# Patient Record
Sex: FEMALE | Race: BLACK OR AFRICAN AMERICAN | NOT HISPANIC OR LATINO | Employment: FULL TIME | URBAN - METROPOLITAN AREA
[De-identification: names, ages, dates, MRNs, and addresses within clinical notes are randomized per-mention and may not be internally consistent; named-entity substitution may affect disease eponyms.]

---

## 2023-02-24 LAB
ALANINE AMINOTRANSFERASE (SGPT) (U/L) IN SER/PLAS: 11 U/L (ref 7–45)
ALBUMIN (G/DL) IN SER/PLAS: 3.8 G/DL (ref 3.4–5)
ALKALINE PHOSPHATASE (U/L) IN SER/PLAS: 70 U/L (ref 33–110)
ANION GAP IN SER/PLAS: 12 MMOL/L (ref 10–20)
APPEARANCE, URINE: CLEAR
ASPARTATE AMINOTRANSFERASE (SGOT) (U/L) IN SER/PLAS: 14 U/L (ref 9–39)
BASOPHILS (10*3/UL) IN BLOOD BY AUTOMATED COUNT: 0.05 X10E9/L (ref 0–0.1)
BASOPHILS/100 LEUKOCYTES IN BLOOD BY AUTOMATED COUNT: 0.4 % (ref 0–2)
BILIRUBIN TOTAL (MG/DL) IN SER/PLAS: 0.5 MG/DL (ref 0–1.2)
BILIRUBIN, URINE: NEGATIVE
BLOOD, URINE: NEGATIVE
CALCIDIOL (25 OH VITAMIN D3) (NG/ML) IN SER/PLAS: 28 NG/ML
CALCIUM (MG/DL) IN SER/PLAS: 9 MG/DL (ref 8.6–10.3)
CARBON DIOXIDE, TOTAL (MMOL/L) IN SER/PLAS: 26 MMOL/L (ref 21–32)
CHLORIDE (MMOL/L) IN SER/PLAS: 107 MMOL/L (ref 98–107)
CHOLESTEROL (MG/DL) IN SER/PLAS: 196 MG/DL (ref 0–199)
CHOLESTEROL IN HDL (MG/DL) IN SER/PLAS: 50.9 MG/DL
CHOLESTEROL/HDL RATIO: 3.9
COBALAMIN (VITAMIN B12) (PG/ML) IN SER/PLAS: 738 PG/ML (ref 211–911)
COLOR, URINE: YELLOW
CREATININE (MG/DL) IN SER/PLAS: 0.84 MG/DL (ref 0.5–1.05)
EOSINOPHILS (10*3/UL) IN BLOOD BY AUTOMATED COUNT: 0.11 X10E9/L (ref 0–0.7)
EOSINOPHILS/100 LEUKOCYTES IN BLOOD BY AUTOMATED COUNT: 0.8 % (ref 0–6)
ERYTHROCYTE DISTRIBUTION WIDTH (RATIO) BY AUTOMATED COUNT: 15.8 % (ref 11.5–14.5)
ERYTHROCYTE MEAN CORPUSCULAR HEMOGLOBIN CONCENTRATION (G/DL) BY AUTOMATED: 32.9 G/DL (ref 32–36)
ERYTHROCYTE MEAN CORPUSCULAR VOLUME (FL) BY AUTOMATED COUNT: 93 FL (ref 80–100)
ERYTHROCYTES (10*6/UL) IN BLOOD BY AUTOMATED COUNT: 4.54 X10E12/L (ref 4–5.2)
ESTIMATED AVERAGE GLUCOSE FOR HBA1C: 105 MG/DL
GFR FEMALE: 82 ML/MIN/1.73M2
GLUCOSE (MG/DL) IN SER/PLAS: 75 MG/DL (ref 74–99)
GLUCOSE, URINE: NEGATIVE MG/DL
HEMATOCRIT (%) IN BLOOD BY AUTOMATED COUNT: 42.3 % (ref 36–46)
HEMOGLOBIN (G/DL) IN BLOOD: 13.9 G/DL (ref 12–16)
HEMOGLOBIN A1C/HEMOGLOBIN TOTAL IN BLOOD: 5.3 %
IMMATURE GRANULOCYTES/100 LEUKOCYTES IN BLOOD BY AUTOMATED COUNT: 0.3 % (ref 0–0.9)
KETONES, URINE: NEGATIVE MG/DL
LDL: 124 MG/DL (ref 0–99)
LEUKOCYTE ESTERASE, URINE: NEGATIVE
LEUKOCYTES (10*3/UL) IN BLOOD BY AUTOMATED COUNT: 13.5 X10E9/L (ref 4.4–11.3)
LYMPHOCYTES (10*3/UL) IN BLOOD BY AUTOMATED COUNT: 2.63 X10E9/L (ref 1.2–4.8)
LYMPHOCYTES/100 LEUKOCYTES IN BLOOD BY AUTOMATED COUNT: 19.5 % (ref 13–44)
MONOCYTES (10*3/UL) IN BLOOD BY AUTOMATED COUNT: 1.02 X10E9/L (ref 0.1–1)
MONOCYTES/100 LEUKOCYTES IN BLOOD BY AUTOMATED COUNT: 7.6 % (ref 2–10)
NEUTROPHILS (10*3/UL) IN BLOOD BY AUTOMATED COUNT: 9.65 X10E9/L (ref 1.2–7.7)
NEUTROPHILS/100 LEUKOCYTES IN BLOOD BY AUTOMATED COUNT: 71.4 % (ref 40–80)
NITRITE, URINE: NEGATIVE
PH, URINE: 5.5 (ref 5–8)
PLATELETS (10*3/UL) IN BLOOD AUTOMATED COUNT: 352 X10E9/L (ref 150–450)
POTASSIUM (MMOL/L) IN SER/PLAS: 4.2 MMOL/L (ref 3.5–5.3)
PROTEIN TOTAL: 7 G/DL (ref 6.4–8.2)
PROTEIN, URINE: NEGATIVE MG/DL
SODIUM (MMOL/L) IN SER/PLAS: 141 MMOL/L (ref 136–145)
SPECIFIC GRAVITY, URINE: 1.03 (ref 1–1.03)
THYROTROPIN (MIU/L) IN SER/PLAS BY DETECTION LIMIT <= 0.05 MIU/L: 1.9 MIU/L (ref 0.44–3.98)
TRIGLYCERIDE (MG/DL) IN SER/PLAS: 106 MG/DL (ref 0–149)
UREA NITROGEN (MG/DL) IN SER/PLAS: 19 MG/DL (ref 6–23)
UROBILINOGEN, URINE: <2 MG/DL (ref 0–1.9)
VLDL: 21 MG/DL (ref 0–40)

## 2023-04-09 PROBLEM — M54.2 NECK PAIN: Status: ACTIVE | Noted: 2023-04-09

## 2023-04-09 PROBLEM — Z98.1 STATUS POST CERVICAL SPINAL FUSION: Status: ACTIVE | Noted: 2023-04-09

## 2023-04-09 PROBLEM — M25.562 BILATERAL KNEE PAIN: Status: ACTIVE | Noted: 2023-04-09

## 2023-04-09 PROBLEM — G47.33 OBSTRUCTIVE SLEEP APNEA: Status: ACTIVE | Noted: 2023-04-09

## 2023-04-09 PROBLEM — G44.40 MEDICATION OVERUSE HEADACHE: Status: ACTIVE | Noted: 2023-04-09

## 2023-04-09 PROBLEM — S13.9XXA CERVICAL SPRAIN: Status: ACTIVE | Noted: 2023-04-09

## 2023-04-09 PROBLEM — R05.9 COUGH: Status: ACTIVE | Noted: 2023-04-09

## 2023-04-09 PROBLEM — G89.29 CHRONIC MUSCULOSKELETAL PAIN: Status: ACTIVE | Noted: 2023-04-09

## 2023-04-09 PROBLEM — H02.843 SWELLING OF EYELID, RIGHT: Status: ACTIVE | Noted: 2023-04-09

## 2023-04-09 PROBLEM — M77.8 SHOULDER TENDINITIS, LEFT: Status: ACTIVE | Noted: 2023-04-09

## 2023-04-09 PROBLEM — G56.02 CARPAL TUNNEL SYNDROME OF LEFT WRIST: Status: ACTIVE | Noted: 2023-04-09

## 2023-04-09 PROBLEM — J45.909 ASTHMATIC BRONCHITIS (HHS-HCC): Status: ACTIVE | Noted: 2023-04-09

## 2023-04-09 PROBLEM — D25.9 FIBROID UTERUS: Status: ACTIVE | Noted: 2023-04-09

## 2023-04-09 PROBLEM — S80.02XA CONTUSION OF KNEE, LEFT: Status: ACTIVE | Noted: 2023-04-09

## 2023-04-09 PROBLEM — M79.7 FIBROMYALGIA: Status: ACTIVE | Noted: 2023-04-09

## 2023-04-09 PROBLEM — S43.409A SHOULDER SPRAIN: Status: ACTIVE | Noted: 2023-04-09

## 2023-04-09 PROBLEM — F17.200 TOBACCO DEPENDENCE: Status: ACTIVE | Noted: 2023-04-09

## 2023-04-09 PROBLEM — R19.7 DIARRHEA: Status: ACTIVE | Noted: 2023-04-09

## 2023-04-09 PROBLEM — M16.0 BILATERAL HIP JOINT ARTHRITIS: Status: ACTIVE | Noted: 2023-04-09

## 2023-04-09 PROBLEM — H53.8 BLURRED VISION, BILATERAL: Status: ACTIVE | Noted: 2023-04-09

## 2023-04-09 PROBLEM — N93.9 ABNORMAL UTERINE BLEEDING (AUB): Status: ACTIVE | Noted: 2023-04-09

## 2023-04-09 PROBLEM — M79.642 PAIN OF LEFT HAND: Status: ACTIVE | Noted: 2023-04-09

## 2023-04-09 PROBLEM — J06.9 ACUTE URI: Status: ACTIVE | Noted: 2023-04-09

## 2023-04-09 PROBLEM — M62.89 HIGH-TONE PELVIC FLOOR DYSFUNCTION IN FEMALE: Status: ACTIVE | Noted: 2023-04-09

## 2023-04-09 PROBLEM — M51.36 LUMBAR DEGENERATIVE DISC DISEASE: Status: ACTIVE | Noted: 2023-04-09

## 2023-04-09 PROBLEM — R07.9 CHEST PAIN: Status: ACTIVE | Noted: 2023-04-09

## 2023-04-09 PROBLEM — M54.32 BILATERAL SCIATICA: Status: ACTIVE | Noted: 2023-04-09

## 2023-04-09 PROBLEM — S63.636A SPRAIN OF INTERPHALANGEAL JOINT OF RIGHT LITTLE FINGER: Status: ACTIVE | Noted: 2023-04-09

## 2023-04-09 PROBLEM — M51.369 LUMBAR DEGENERATIVE DISC DISEASE: Status: ACTIVE | Noted: 2023-04-09

## 2023-04-09 PROBLEM — E66.01 MORBID OBESITY (MULTI): Status: ACTIVE | Noted: 2023-04-09

## 2023-04-09 PROBLEM — D17.0 LIPOMA OF NECK: Status: ACTIVE | Noted: 2023-04-09

## 2023-04-09 PROBLEM — G43.909 MIGRAINES: Status: ACTIVE | Noted: 2023-04-09

## 2023-04-09 PROBLEM — L85.1 ACQUIRED PLANTAR POROKERATOSIS: Status: ACTIVE | Noted: 2023-04-09

## 2023-04-09 PROBLEM — L30.9 ECZEMA: Status: ACTIVE | Noted: 2023-04-09

## 2023-04-09 PROBLEM — R42 DIZZINESS: Status: ACTIVE | Noted: 2023-04-09

## 2023-04-09 PROBLEM — M79.644 THUMB PAIN, RIGHT: Status: ACTIVE | Noted: 2023-04-09

## 2023-04-09 PROBLEM — B07.0 PLANTAR WART OF RIGHT FOOT: Status: ACTIVE | Noted: 2023-04-09

## 2023-04-09 PROBLEM — R55 VASOVAGAL SYNCOPE: Status: ACTIVE | Noted: 2023-04-09

## 2023-04-09 PROBLEM — I51.7 CARDIOMEGALY: Status: ACTIVE | Noted: 2023-04-09

## 2023-04-09 PROBLEM — M25.561 BILATERAL KNEE PAIN: Status: ACTIVE | Noted: 2023-04-09

## 2023-04-09 PROBLEM — R59.9 REACTIVE LYMPHADENOPATHY: Status: ACTIVE | Noted: 2023-04-09

## 2023-04-09 PROBLEM — H40.003 GLAUCOMA SUSPECT, BOTH EYES: Status: ACTIVE | Noted: 2023-04-09

## 2023-04-09 PROBLEM — M54.12 LEFT CERVICAL RADICULOPATHY: Status: ACTIVE | Noted: 2023-04-09

## 2023-04-09 PROBLEM — R41.82 CHANGE IN MENTAL STATUS: Status: ACTIVE | Noted: 2023-04-09

## 2023-04-09 PROBLEM — S89.90XA KNEE INJURY, INITIAL ENCOUNTER: Status: ACTIVE | Noted: 2023-04-09

## 2023-04-09 PROBLEM — M54.32 SCIATICA OF LEFT SIDE: Status: ACTIVE | Noted: 2023-04-09

## 2023-04-09 PROBLEM — J06.9 UPPER RESPIRATORY INFECTION: Status: ACTIVE | Noted: 2023-04-09

## 2023-04-09 PROBLEM — B02.39: Status: ACTIVE | Noted: 2023-04-09

## 2023-04-09 PROBLEM — D17.0 LIPOMA OF SCALP: Status: ACTIVE | Noted: 2023-04-09

## 2023-04-09 PROBLEM — F19.939 MEDICATION WITHDRAWAL (MULTI): Status: ACTIVE | Noted: 2023-04-09

## 2023-04-09 PROBLEM — E04.9 GOITER: Status: ACTIVE | Noted: 2023-04-09

## 2023-04-09 PROBLEM — J40 SINOBRONCHITIS: Status: ACTIVE | Noted: 2023-04-09

## 2023-04-09 PROBLEM — M25.572 ANKLE PAIN, LEFT: Status: ACTIVE | Noted: 2023-04-09

## 2023-04-09 PROBLEM — R53.83 FATIGUE: Status: ACTIVE | Noted: 2023-04-09

## 2023-04-09 PROBLEM — S05.02XA LEFT CORNEAL ABRASION: Status: ACTIVE | Noted: 2023-04-09

## 2023-04-09 PROBLEM — S86.912A KNEE STRAIN, LEFT, INITIAL ENCOUNTER: Status: ACTIVE | Noted: 2023-04-09

## 2023-04-09 PROBLEM — J01.80 OTHER ACUTE SINUSITIS: Status: ACTIVE | Noted: 2023-04-09

## 2023-04-09 PROBLEM — J32.9 SINOBRONCHITIS: Status: ACTIVE | Noted: 2023-04-09

## 2023-04-09 PROBLEM — J06.9 ACUTE URI: Status: RESOLVED | Noted: 2023-04-09 | Resolved: 2023-04-09

## 2023-04-09 PROBLEM — M12.9 ARTHROPATHY: Status: ACTIVE | Noted: 2023-04-09

## 2023-04-09 PROBLEM — R10.9 ABDOMINAL PAIN: Status: ACTIVE | Noted: 2023-04-09

## 2023-04-09 PROBLEM — F41.8 DEPRESSION WITH ANXIETY: Status: ACTIVE | Noted: 2023-04-09

## 2023-04-09 PROBLEM — M17.10 ARTHRITIS OF KNEE: Status: ACTIVE | Noted: 2023-04-09

## 2023-04-09 PROBLEM — M25.641 STIFFNESS OF FINGER JOINT OF RIGHT HAND: Status: ACTIVE | Noted: 2023-04-09

## 2023-04-09 PROBLEM — M65.311 TRIGGER FINGER OF RIGHT THUMB: Status: ACTIVE | Noted: 2023-04-09

## 2023-04-09 PROBLEM — M79.606 LEG PAIN: Status: ACTIVE | Noted: 2023-04-09

## 2023-04-09 PROBLEM — R21 SKIN RASH: Status: ACTIVE | Noted: 2023-04-09

## 2023-04-09 PROBLEM — M48.02 CERVICAL SPINAL STENOSIS: Status: ACTIVE | Noted: 2023-04-09

## 2023-04-09 PROBLEM — L30.9 DERMATITIS: Status: ACTIVE | Noted: 2023-04-09

## 2023-04-09 PROBLEM — R29.818 SUSPECTED SLEEP APNEA: Status: ACTIVE | Noted: 2023-04-09

## 2023-04-09 PROBLEM — S82.832A FRACTURE OF FIBULA, DISTAL, LEFT, CLOSED: Status: ACTIVE | Noted: 2023-04-09

## 2023-04-09 PROBLEM — R91.1 LUNG NODULE: Status: ACTIVE | Noted: 2023-04-09

## 2023-04-09 PROBLEM — N83.201 CYST OF RIGHT OVARY: Status: ACTIVE | Noted: 2023-04-09

## 2023-04-09 PROBLEM — R11.2 NAUSEA AND VOMITING: Status: ACTIVE | Noted: 2023-04-09

## 2023-04-09 PROBLEM — M79.641 PAIN OF RIGHT HAND: Status: ACTIVE | Noted: 2023-04-09

## 2023-04-09 PROBLEM — R06.02 SHORTNESS OF BREATH: Status: ACTIVE | Noted: 2023-04-09

## 2023-04-09 PROBLEM — M50.90 CERVICAL DISC DISEASE: Status: ACTIVE | Noted: 2023-04-09

## 2023-04-09 PROBLEM — N95.2 VAGINAL ATROPHY: Status: ACTIVE | Noted: 2023-04-09

## 2023-04-09 PROBLEM — M40.202 CERVICAL KYPHOSIS: Status: ACTIVE | Noted: 2023-04-09

## 2023-04-09 PROBLEM — H02.89 IRRITATION OF EYELID: Status: ACTIVE | Noted: 2023-04-09

## 2023-04-09 PROBLEM — M21.619 BUNION: Status: ACTIVE | Noted: 2023-04-09

## 2023-04-09 PROBLEM — G56.01 RIGHT CARPAL TUNNEL SYNDROME: Status: ACTIVE | Noted: 2023-04-09

## 2023-04-09 PROBLEM — N89.8 VAGINAL DISCHARGE: Status: ACTIVE | Noted: 2023-04-09

## 2023-04-09 PROBLEM — M19.012 ARTHRITIS OF SHOULDER REGION, LEFT: Status: ACTIVE | Noted: 2023-04-09

## 2023-04-09 PROBLEM — H57.10 EYE DISCOMFORT: Status: ACTIVE | Noted: 2023-04-09

## 2023-04-09 PROBLEM — I10 HYPERTENSION: Status: ACTIVE | Noted: 2023-04-09

## 2023-04-09 PROBLEM — H01.029 SQUAMOUS BLEPHARITIS: Status: ACTIVE | Noted: 2023-04-09

## 2023-04-09 PROBLEM — R92.8 ABNORMAL MAMMOGRAM OF LEFT BREAST: Status: ACTIVE | Noted: 2023-04-09

## 2023-04-09 PROBLEM — J02.9 SORE THROAT: Status: ACTIVE | Noted: 2023-04-09

## 2023-04-09 PROBLEM — H81.10 BPPV (BENIGN PAROXYSMAL POSITIONAL VERTIGO): Status: ACTIVE | Noted: 2023-04-09

## 2023-04-09 PROBLEM — R06.83 SNORING: Status: ACTIVE | Noted: 2023-04-09

## 2023-04-09 PROBLEM — E66.9 OBESITY: Status: ACTIVE | Noted: 2023-04-09

## 2023-04-09 PROBLEM — L03.213 PRESEPTAL CELLULITIS: Status: ACTIVE | Noted: 2023-04-09

## 2023-04-09 PROBLEM — S90.829A: Status: ACTIVE | Noted: 2023-04-09

## 2023-04-09 PROBLEM — H04.123 DRY EYE SYNDROME, BILATERAL: Status: ACTIVE | Noted: 2023-04-09

## 2023-04-09 PROBLEM — N76.0 VAGINITIS: Status: ACTIVE | Noted: 2023-04-09

## 2023-04-09 PROBLEM — M79.18 CHRONIC MUSCULOSKELETAL PAIN: Status: ACTIVE | Noted: 2023-04-09

## 2023-04-09 PROBLEM — S61.212A LACERATION OF RIGHT MIDDLE FINGER WITHOUT FOREIGN BODY WITHOUT DAMAGE TO NAIL: Status: ACTIVE | Noted: 2023-04-09

## 2023-04-09 PROBLEM — N89.8 VAGINAL IRRITATION: Status: ACTIVE | Noted: 2023-04-09

## 2023-04-09 PROBLEM — L03.211 FACIAL CELLULITIS: Status: ACTIVE | Noted: 2023-04-09

## 2023-04-09 PROBLEM — B00.1 COLD SORE: Status: ACTIVE | Noted: 2023-04-09

## 2023-04-09 PROBLEM — R93.1 ABNORMAL ECHOCARDIOGRAM: Status: ACTIVE | Noted: 2023-04-09

## 2023-04-09 PROBLEM — S00.83XA FOREHEAD CONTUSION: Status: ACTIVE | Noted: 2023-04-09

## 2023-04-09 PROBLEM — H44.009 INFECTION OF EYE REGION: Status: ACTIVE | Noted: 2023-04-09

## 2023-04-09 PROBLEM — M25.512 LEFT SHOULDER PAIN: Status: ACTIVE | Noted: 2023-04-09

## 2023-04-09 PROBLEM — M54.31 BILATERAL SCIATICA: Status: ACTIVE | Noted: 2023-04-09

## 2023-04-09 RX ORDER — MULTIVITAMIN
1 TABLET ORAL DAILY
COMMUNITY

## 2023-04-09 RX ORDER — TRIAMCINOLONE ACETONIDE 1 MG/G
CREAM TOPICAL
COMMUNITY
Start: 2022-08-31 | End: 2023-11-02

## 2023-04-09 RX ORDER — ALBUTEROL SULFATE 90 UG/1
2 AEROSOL, METERED RESPIRATORY (INHALATION) EVERY 4 HOURS PRN
COMMUNITY
Start: 2015-03-26 | End: 2023-12-21 | Stop reason: SDUPTHER

## 2023-04-09 RX ORDER — RIZATRIPTAN BENZOATE 10 MG/1
TABLET ORAL
COMMUNITY
Start: 2021-01-14 | End: 2023-06-02 | Stop reason: SDUPTHER

## 2023-04-09 RX ORDER — TIZANIDINE 4 MG/1
4 TABLET ORAL EVERY 6 HOURS PRN
COMMUNITY
Start: 2022-06-01 | End: 2023-12-21

## 2023-04-09 RX ORDER — VALACYCLOVIR HYDROCHLORIDE 1 G/1
1000 TABLET, FILM COATED ORAL EVERY 12 HOURS
COMMUNITY
Start: 2019-03-17

## 2023-04-09 RX ORDER — TOPIRAMATE 50 MG/1
1 TABLET, FILM COATED ORAL 2 TIMES DAILY
COMMUNITY
Start: 2018-11-19 | End: 2023-08-31 | Stop reason: SDUPTHER

## 2023-04-09 RX ORDER — VENLAFAXINE HYDROCHLORIDE 37.5 MG/1
CAPSULE, EXTENDED RELEASE ORAL
COMMUNITY
Start: 2021-08-25 | End: 2023-08-31 | Stop reason: SDUPTHER

## 2023-04-09 RX ORDER — MELOXICAM 15 MG/1
1 TABLET ORAL DAILY
COMMUNITY
Start: 2016-08-15 | End: 2023-08-31 | Stop reason: SDUPTHER

## 2023-04-09 RX ORDER — FLUTICASONE PROPIONATE AND SALMETEROL 250; 50 UG/1; UG/1
1 POWDER RESPIRATORY (INHALATION) EVERY 12 HOURS
COMMUNITY
Start: 2022-08-31 | End: 2023-08-31 | Stop reason: SDUPTHER

## 2023-04-09 RX ORDER — VENLAFAXINE HYDROCHLORIDE 75 MG/1
1 CAPSULE, EXTENDED RELEASE ORAL DAILY
COMMUNITY
Start: 2020-06-19 | End: 2023-08-16 | Stop reason: SDUPTHER

## 2023-04-09 RX ORDER — ACETAMINOPHEN 500 MG
TABLET ORAL EVERY 6 HOURS
COMMUNITY
Start: 2022-04-11

## 2023-04-09 RX ORDER — TRAMADOL HYDROCHLORIDE 50 MG/1
50 TABLET ORAL 4 TIMES DAILY PRN
COMMUNITY
Start: 2021-03-24 | End: 2023-08-31 | Stop reason: ALTCHOICE

## 2023-04-09 RX ORDER — ALBUTEROL SULFATE 90 UG/1
AEROSOL, METERED RESPIRATORY (INHALATION)
COMMUNITY
Start: 2022-08-06 | End: 2023-10-30 | Stop reason: ALTCHOICE

## 2023-04-09 RX ORDER — GABAPENTIN 300 MG/1
2 CAPSULE ORAL 3 TIMES DAILY
COMMUNITY
Start: 2017-07-13 | End: 2023-08-16 | Stop reason: SDUPTHER

## 2023-04-09 RX ORDER — ATORVASTATIN CALCIUM 10 MG/1
10 TABLET, FILM COATED ORAL NIGHTLY
COMMUNITY
End: 2023-12-21 | Stop reason: SDUPTHER

## 2023-04-09 RX ORDER — HYDROCHLOROTHIAZIDE 25 MG/1
1 TABLET ORAL DAILY
COMMUNITY
Start: 2020-11-25 | End: 2023-08-31 | Stop reason: SDUPTHER

## 2023-04-09 RX ORDER — FLUTICASONE PROPIONATE 50 MCG
SPRAY, SUSPENSION (ML) NASAL
COMMUNITY
Start: 2022-02-17 | End: 2023-11-02 | Stop reason: SDUPTHER

## 2023-04-09 RX ORDER — PLANT STANOL ESTER 450 MG
2 TABLET ORAL DAILY
COMMUNITY
Start: 2017-05-05 | End: 2023-08-31

## 2023-04-10 ENCOUNTER — APPOINTMENT (OUTPATIENT)
Dept: PRIMARY CARE | Facility: CLINIC | Age: 53
End: 2023-04-10
Payer: COMMERCIAL

## 2023-06-02 DIAGNOSIS — G43.911 INTRACTABLE MIGRAINE WITH STATUS MIGRAINOSUS, UNSPECIFIED MIGRAINE TYPE: ICD-10-CM

## 2023-06-02 RX ORDER — RIZATRIPTAN BENZOATE 10 MG/1
TABLET ORAL
Qty: 10 TABLET | Refills: 0 | Status: SHIPPED | OUTPATIENT
Start: 2023-06-02 | End: 2023-06-30

## 2023-06-14 DIAGNOSIS — M54.2 NECK PAIN: ICD-10-CM

## 2023-06-16 RX ORDER — TRAMADOL HYDROCHLORIDE 50 MG/1
50 TABLET ORAL 4 TIMES DAILY PRN
Qty: 60 TABLET | Refills: 0 | OUTPATIENT
Start: 2023-06-16 | End: 2023-07-16

## 2023-06-16 NOTE — TELEPHONE ENCOUNTER
This is an inappropriate request for medication refill. Dr. Davis is no longer with [facility] > than 30 days. Patient needs new PCP. I will NOT refill this. Please notify patient.

## 2023-06-30 DIAGNOSIS — G43.911 INTRACTABLE MIGRAINE WITH STATUS MIGRAINOSUS, UNSPECIFIED MIGRAINE TYPE: ICD-10-CM

## 2023-06-30 RX ORDER — RIZATRIPTAN BENZOATE 10 MG/1
TABLET ORAL
Qty: 9 TABLET | Refills: 0 | Status: SHIPPED | OUTPATIENT
Start: 2023-06-30 | End: 2023-11-17 | Stop reason: SDUPTHER

## 2023-07-24 ENCOUNTER — APPOINTMENT (OUTPATIENT)
Dept: PRIMARY CARE | Facility: CLINIC | Age: 53
End: 2023-07-24
Payer: COMMERCIAL

## 2023-08-16 DIAGNOSIS — M79.7 FIBROMYALGIA: ICD-10-CM

## 2023-08-16 DIAGNOSIS — F41.8 DEPRESSION WITH ANXIETY: Primary | ICD-10-CM

## 2023-08-17 RX ORDER — VENLAFAXINE HYDROCHLORIDE 75 MG/1
75 CAPSULE, EXTENDED RELEASE ORAL DAILY
Qty: 90 CAPSULE | Refills: 0 | Status: SHIPPED | OUTPATIENT
Start: 2023-08-17 | End: 2023-08-31 | Stop reason: SDUPTHER

## 2023-08-17 RX ORDER — GABAPENTIN 300 MG/1
600 CAPSULE ORAL 3 TIMES DAILY
Qty: 180 CAPSULE | Refills: 0 | Status: SHIPPED | OUTPATIENT
Start: 2023-08-17 | End: 2023-09-15

## 2023-08-31 ENCOUNTER — LAB (OUTPATIENT)
Dept: LAB | Facility: LAB | Age: 53
End: 2023-08-31
Payer: COMMERCIAL

## 2023-08-31 ENCOUNTER — OFFICE VISIT (OUTPATIENT)
Dept: PRIMARY CARE | Facility: CLINIC | Age: 53
End: 2023-08-31
Payer: COMMERCIAL

## 2023-08-31 VITALS
OXYGEN SATURATION: 97 % | BODY MASS INDEX: 38.99 KG/M2 | WEIGHT: 234 LBS | HEART RATE: 100 BPM | SYSTOLIC BLOOD PRESSURE: 140 MMHG | DIASTOLIC BLOOD PRESSURE: 80 MMHG | HEIGHT: 65 IN

## 2023-08-31 DIAGNOSIS — M17.0 OSTEOARTHRITIS OF BOTH KNEES, UNSPECIFIED OSTEOARTHRITIS TYPE: ICD-10-CM

## 2023-08-31 DIAGNOSIS — Z79.899 MEDICATION MANAGEMENT: ICD-10-CM

## 2023-08-31 DIAGNOSIS — F17.210 CIGARETTE NICOTINE DEPENDENCE WITHOUT COMPLICATION: ICD-10-CM

## 2023-08-31 DIAGNOSIS — Z79.899 MEDICATION MANAGEMENT: Primary | ICD-10-CM

## 2023-08-31 DIAGNOSIS — F33.1 MODERATE EPISODE OF RECURRENT MAJOR DEPRESSIVE DISORDER (MULTI): ICD-10-CM

## 2023-08-31 DIAGNOSIS — F41.8 DEPRESSION WITH ANXIETY: ICD-10-CM

## 2023-08-31 DIAGNOSIS — M47.22 OSTEOARTHRITIS OF SPINE WITH RADICULOPATHY, CERVICAL REGION: ICD-10-CM

## 2023-08-31 DIAGNOSIS — M46.1 DEGENERATIVE JOINT DISEASE OF SACROILIAC JOINT (CMS-HCC): ICD-10-CM

## 2023-08-31 DIAGNOSIS — Z98.1 S/P CERVICAL SPINAL FUSION: ICD-10-CM

## 2023-08-31 DIAGNOSIS — J45.40 MODERATE PERSISTENT ASTHMA WITHOUT COMPLICATION (HHS-HCC): ICD-10-CM

## 2023-08-31 DIAGNOSIS — Z79.1 LONG TERM (CURRENT) USE OF NON-STEROIDAL ANTI-INFLAMMATORIES (NSAID): ICD-10-CM

## 2023-08-31 DIAGNOSIS — J41.0 SIMPLE CHRONIC BRONCHITIS (MULTI): ICD-10-CM

## 2023-08-31 DIAGNOSIS — I10 PRIMARY HYPERTENSION: ICD-10-CM

## 2023-08-31 DIAGNOSIS — M79.7 FIBROMYALGIA: ICD-10-CM

## 2023-08-31 PROBLEM — F19.939 MEDICATION WITHDRAWAL (MULTI): Status: RESOLVED | Noted: 2023-04-09 | Resolved: 2023-08-31

## 2023-08-31 PROCEDURE — 80346 BENZODIAZEPINES1-12: CPT

## 2023-08-31 PROCEDURE — 80365 DRUG SCREENING OXYCODONE: CPT

## 2023-08-31 PROCEDURE — 99215 OFFICE O/P EST HI 40 MIN: CPT | Performed by: STUDENT IN AN ORGANIZED HEALTH CARE EDUCATION/TRAINING PROGRAM

## 2023-08-31 PROCEDURE — 80354 DRUG SCREENING FENTANYL: CPT

## 2023-08-31 PROCEDURE — 80358 DRUG SCREENING METHADONE: CPT

## 2023-08-31 PROCEDURE — 3077F SYST BP >= 140 MM HG: CPT | Performed by: STUDENT IN AN ORGANIZED HEALTH CARE EDUCATION/TRAINING PROGRAM

## 2023-08-31 PROCEDURE — 80361 OPIATES 1 OR MORE: CPT

## 2023-08-31 PROCEDURE — 80368 SEDATIVE HYPNOTICS: CPT

## 2023-08-31 PROCEDURE — 3079F DIAST BP 80-89 MM HG: CPT | Performed by: STUDENT IN AN ORGANIZED HEALTH CARE EDUCATION/TRAINING PROGRAM

## 2023-08-31 PROCEDURE — 80307 DRUG TEST PRSMV CHEM ANLYZR: CPT

## 2023-08-31 PROCEDURE — 80373 DRUG SCREENING TRAMADOL: CPT

## 2023-08-31 PROCEDURE — 4004F PT TOBACCO SCREEN RCVD TLK: CPT | Performed by: STUDENT IN AN ORGANIZED HEALTH CARE EDUCATION/TRAINING PROGRAM

## 2023-08-31 RX ORDER — MELOXICAM 15 MG/1
15 TABLET ORAL DAILY
Qty: 90 TABLET | Refills: 1 | Status: SHIPPED | OUTPATIENT
Start: 2023-08-31 | End: 2024-03-27

## 2023-08-31 RX ORDER — VENLAFAXINE HYDROCHLORIDE 75 MG/1
75 CAPSULE, EXTENDED RELEASE ORAL DAILY
Qty: 90 CAPSULE | Refills: 3 | Status: SHIPPED | OUTPATIENT
Start: 2023-08-31

## 2023-08-31 RX ORDER — HYDROCHLOROTHIAZIDE 25 MG/1
25 TABLET ORAL DAILY
Qty: 90 TABLET | Refills: 1 | Status: SHIPPED | OUTPATIENT
Start: 2023-08-31 | End: 2024-04-05

## 2023-08-31 RX ORDER — NALOXONE HYDROCHLORIDE 4 MG/.1ML
4 SPRAY NASAL AS NEEDED
Qty: 2 EACH | Refills: 0 | Status: SHIPPED | OUTPATIENT
Start: 2023-08-31 | End: 2024-08-30

## 2023-08-31 RX ORDER — FLUTICASONE PROPIONATE AND SALMETEROL 250; 50 UG/1; UG/1
1 POWDER RESPIRATORY (INHALATION) EVERY 12 HOURS
Qty: 60 EACH | Refills: 11 | Status: SHIPPED | OUTPATIENT
Start: 2023-08-31

## 2023-08-31 RX ORDER — TRAMADOL HYDROCHLORIDE 50 MG/1
50 TABLET ORAL EVERY 6 HOURS PRN
Qty: 30 TABLET | Refills: 2 | Status: SHIPPED | OUTPATIENT
Start: 2023-08-31 | End: 2023-09-07

## 2023-08-31 RX ORDER — VENLAFAXINE HYDROCHLORIDE 37.5 MG/1
CAPSULE, EXTENDED RELEASE ORAL
Qty: 90 CAPSULE | Refills: 3 | Status: SHIPPED | OUTPATIENT
Start: 2023-08-31 | End: 2024-06-04

## 2023-08-31 RX ORDER — TOPIRAMATE 50 MG/1
50 TABLET, FILM COATED ORAL 2 TIMES DAILY
Qty: 180 TABLET | Refills: 3 | Status: SHIPPED | OUTPATIENT
Start: 2023-08-31

## 2023-08-31 NOTE — PATIENT INSTRUCTIONS
We are on a new system that is paperless.     Lab location for blood work :  1. Located across the office , just past the elevators .   2. [address].  If you are coming on a Saturday, go to [address] for the blood draw    Radiology : [address] for Xrays  You can also schedule non urgent imaging by calling .     For scheduling appts, call . You might be receiving call from central scheduling as well.    For scheduling colonoscopy, call .     For scheduling physical therapy, call [phone] ( [phone]).    For any other scheduling questions or locations, please ask the medical assistant or the .  
Private Vehicle

## 2023-08-31 NOTE — PROGRESS NOTES
"Subjective   Patient ID: Iwona Downey is a 52 y.o. female who presents for New Patient Visit ([facility]. ).        HPI    Pt  with asthma, current every day smoker, HTN, migraine HA ,  h/o  intractable cervical radiculopathy secondary to cervical stenosis s/p C5-7 ACDF ( April 2022), mild DJD of SI joints       Experiencing knee pain and hip pain   Not a significant weight loss form Feb, 4lbs noted , though pt reported 20 lb weight loss in 2m   Works as a  for [employer]  In PT for knee pain   Also receiving gel injections into b/l knee joints every 6m at [facility]    She is on the following regimen for pain control   Gabapentin 600mg TID   Topamax 50mg BID   Meloxicam 15mg every day  Tramadol 50mg every day  Tizanidine 4mg BID   Flexeril 10mg every day         HTN : rpt BP as noted in vitals     Currently on abx for bacterial sinusitis       Visit Vitals  /80   Pulse 100   Ht 1.651 m (5' 5\")   Wt 106 kg (234 lb)   SpO2 97%   BMI 38.94 kg/m²   Smoking Status Every Day   BSA 2.2 m²      No LMP recorded.     Review of Systems    Constitutional : No feeling poorly / fevers/ chills / night sweats/ fatigue   Cardiovascular : No CP /Palpitations/ lower extremity edema / syncope   Respiratory : No Cough /MONTIEL/Dyspnea at rest   Gastrointestinal : No abd pain / N/V  No bloody stools/ melena / constipation  Endo : No polyuria/polydipsia/ muscle weakness / sluggishness   CNS: No confusion / HA/ tingling/ numbness/ weakness of extremities  Psychiatric: No anxiety/ depression/ SI/HI    All other systems have been reviewed and are negative for complaint       Physical Exam    Constitutional : Vitals reviewed. Alert and in no distress  Cardiovascular : RRR, Normal S1, S2, No pericardial rub/ gallop, no peripheral edema   Pulmonary: No respiratory distress, CTAB   MSK : Normal gait and station , strength and tone     Neurologic : CNs 2-12 grossly intact , no obvious FNDs  Psych : A,Ox3, normal mood and affect  "     Assessment/Plan   Diagnoses and all orders for this visit:  Medication management  -     Opiate/Opioid/Benzo Extended Prescription Compliance; Future  -     naloxone (Narcan) 4 mg/0.1 mL nasal spray; Administer 1 spray (4 mg) into affected nostril(s) if needed for opioid reversal. May repeat every 2-3 minutes if needed, alternating nostrils, until medical assistance becomes available.  Long term (current) use of non-steroidal anti-inflammatories (nsaid)  Osteoarthritis of both knees, unspecified osteoarthritis type  -     traMADol (Ultram) 50 mg tablet; Take 1 tablet (50 mg) by mouth every 6 hours if needed for severe pain (7 - 10) for up to 7 days.  -     meloxicam (Mobic) 15 mg tablet; Take 1 tablet (15 mg) by mouth once daily.  Degenerative joint disease of sacroiliac joint (CMS/HCC)  -     traMADol (Ultram) 50 mg tablet; Take 1 tablet (50 mg) by mouth every 6 hours if needed for severe pain (7 - 10) for up to 7 days.  -     meloxicam (Mobic) 15 mg tablet; Take 1 tablet (15 mg) by mouth once daily.  Osteoarthritis of spine with radiculopathy, cervical region  -     traMADol (Ultram) 50 mg tablet; Take 1 tablet (50 mg) by mouth every 6 hours if needed for severe pain (7 - 10) for up to 7 days.  -     meloxicam (Mobic) 15 mg tablet; Take 1 tablet (15 mg) by mouth once daily.  S/P cervical spinal fusion  -     traMADol (Ultram) 50 mg tablet; Take 1 tablet (50 mg) by mouth every 6 hours if needed for severe pain (7 - 10) for up to 7 days.  -     meloxicam (Mobic) 15 mg tablet; Take 1 tablet (15 mg) by mouth once daily.  Primary hypertension  -     hydroCHLOROthiazide (HYDRODiuril) 25 mg tablet; Take 1 tablet (25 mg) by mouth once daily.  -     fluticasone propion-salmeteroL (Advair Diskus) 250-50 mcg/dose diskus inhaler; Inhale 1 puff every 12 hours. Rinse mouth after each use  Fibromyalgia  -     topiramate (Topamax) 50 mg tablet; Take 1 tablet (50 mg) by mouth 2 times a day.  Moderate episode of recurrent major  depressive disorder (CMS/HCC)  -     venlafaxine XR (Effexor-XR) 75 mg 24 hr capsule; Take 1 capsule (75 mg) by mouth once daily.  -     venlafaxine XR (Effexor-XR) 37.5 mg 24 hr capsule; ONE EVERY DAY - TAKE ALONG WITH 75MG CAPSULE  Cigarette nicotine dependence without complication  -     fluticasone propion-salmeteroL (Advair Diskus) 250-50 mcg/dose diskus inhaler; Inhale 1 puff every 12 hours. Rinse mouth after each use  Moderate persistent asthma without complication  Simple chronic bronchitis (CMS/HCC)  -     fluticasone propion-salmeteroL (Advair Diskus) 250-50 mcg/dose diskus inhaler; Inhale 1 puff every 12 hours. Rinse mouth after each use  Depression with anxiety  -     venlafaxine XR (Effexor-XR) 75 mg 24 hr capsule; Take 1 capsule (75 mg) by mouth once daily.  -     venlafaxine XR (Effexor-XR) 37.5 mg 24 hr capsule; ONE EVERY DAY - TAKE ALONG WITH 75MG CAPSULE    51 y/o female with asthma, current every day smoker, HTN, migraine HA ,  h/o  intractable cervical radiculopathy secondary to cervical stenosis s/p C5-7 ACDF ( April 2022), mild DJD of SI joints , DJD of knee joints ( managed at [facility] , no recent Xrays to review ) and hips     On review of her meds , I am concerned that she is on multiple meds for pain control .    She is on the following regimen for pain control   Gabapentin 600mg TID   Tpomax 50mg BID   Meloxicam 15mg every day  Tramadol 50mg every day  Tizanidine 4mg BID   Flexeril 10mg every day    We discussed that goal of pain mgmt is to help her to be functional with ADLS and not pain level to be zero   Long term use of nsaid discussed   Medications like Aleve ( Naproxen), advil ( Ibuprofen ) , meloxicam , aspirin etc., when used in excess on a regular basis can cause stomach bleed and kidney damage.   These medications are advised to be taken with food and sparingly.    As of this visit ,  I will dc flexeril , she  will continue rest of the regimen with plans to dc meds in the future  PDMP  reviewed   [facility] obtained   UDS ordered      Conditions addressed and mgmt as noted above.  Pertinent labs, images/ imaging reports , chart review was done .   Age appropriate labs / labs for mgmt of chronic medical conditions ordered, further mgmt pending the results.

## 2023-08-31 NOTE — LETTER
August 31, 2023     Patient: Iwona Downey   YOB: 1970   Date of Visit: 8/31/2023       To Whom It May Concern:    Iwona Downey was seen in my clinic on 8/31/2023 at 11:30 am. Please excuse Iwona for her absence from work on this day to make the appointment.    If you have any questions or concerns, please don't hesitate to call.         Sincerely,         Clara Wharton MD        CC: No Recipients

## 2023-09-07 LAB
6-ACETYLMORPHINE: <25 NG/ML
7-AMINOCLONAZEPAM: <25 NG/ML
ALPHA-HYDROXYALPRAZOLAM: <25 NG/ML
ALPHA-HYDROXYMIDAZOLAM: <25 NG/ML
ALPRAZOLAM: <25 NG/ML
AMPHETAMINE (PRESENCE) IN URINE BY SCREEN METHOD: NORMAL
BARBITURATES PRESENCE IN URINE BY SCREEN METHOD: NORMAL
CANNABINOIDS IN URINE BY SCREEN METHOD: NORMAL
CHLORDIAZEPOXIDE: <25 NG/ML
CLONAZEPAM: <25 NG/ML
COCAINE (PRESENCE) IN URINE BY SCREEN METHOD: NORMAL
CODEINE: <50 NG/ML
CREATINE, URINE FOR DRUG: 211.1 MG/DL
DIAZEPAM: <25 NG/ML
DRUG SCREEN COMMENT URINE: NORMAL
EDDP: <25 NG/ML
FENTANYL CONFIRMATION, URINE: <2.5 NG/ML
HYDROCODONE: <25 NG/ML
HYDROMORPHONE: <25 NG/ML
LORAZEPAM: <25 NG/ML
METHADONE CONFIRMATION,URINE: <25 NG/ML
MIDAZOLAM: <25 NG/ML
MORPHINE URINE: <50 NG/ML
NORDIAZEPAM: <25 NG/ML
NORFENTANYL: <2.5 NG/ML
NORHYDROCODONE: <25 NG/ML
NOROXYCODONE: <25 NG/ML
O-DESMETHYLTRAMADOL: <50 NG/ML
OXAZEPAM: <25 NG/ML
OXYCODONE: <25 NG/ML
OXYMORPHONE: <25 NG/ML
PHENCYCLIDINE (PRESENCE) IN URINE BY SCREEN METHOD: NORMAL
TEMAZEPAM: <25 NG/ML
TRAMADOL: <50 NG/ML
ZOLPIDEM METABOLITE (ZCA): <25 NG/ML
ZOLPIDEM: <25 NG/ML

## 2023-09-14 DIAGNOSIS — G43.911 INTRACTABLE MIGRAINE WITH STATUS MIGRAINOSUS, UNSPECIFIED MIGRAINE TYPE: ICD-10-CM

## 2023-09-14 DIAGNOSIS — M79.7 FIBROMYALGIA: ICD-10-CM

## 2023-09-15 RX ORDER — GABAPENTIN 300 MG/1
600 CAPSULE ORAL 3 TIMES DAILY
Qty: 180 CAPSULE | Refills: 1 | Status: SHIPPED | OUTPATIENT
Start: 2023-09-15 | End: 2023-11-28

## 2023-10-30 ENCOUNTER — OFFICE VISIT (OUTPATIENT)
Dept: DERMATOLOGY | Facility: CLINIC | Age: 53
End: 2023-10-30
Payer: COMMERCIAL

## 2023-10-30 DIAGNOSIS — D22.5 MELANOCYTIC NEVUS OF TRUNK: ICD-10-CM

## 2023-10-30 DIAGNOSIS — L81.8 IDIOPATHIC GUTTATE HYPOMELANOSIS: Primary | ICD-10-CM

## 2023-10-30 DIAGNOSIS — L81.4 LENTIGO: ICD-10-CM

## 2023-10-30 DIAGNOSIS — Z85.828 HISTORY OF NONMELANOMA SKIN CANCER: ICD-10-CM

## 2023-10-30 DIAGNOSIS — L57.8 DIFFUSE PHOTODAMAGE OF SKIN: ICD-10-CM

## 2023-10-30 DIAGNOSIS — L70.0 ACNE VULGARIS: ICD-10-CM

## 2023-10-30 PROCEDURE — 4004F PT TOBACCO SCREEN RCVD TLK: CPT | Performed by: DERMATOLOGY

## 2023-10-30 PROCEDURE — 99214 OFFICE O/P EST MOD 30 MIN: CPT | Performed by: DERMATOLOGY

## 2023-10-30 RX ORDER — NAPROXEN 250 MG/1
250 TABLET ORAL DAILY
COMMUNITY
Start: 2023-10-16 | End: 2023-11-02 | Stop reason: ALTCHOICE

## 2023-10-30 ASSESSMENT — DERMATOLOGY PATIENT ASSESSMENT
ARE YOU TRYING TO GET PREGNANT: NO
DO YOU USE A TANNING BED: NO
DO YOU USE SUNSCREEN: OCCASIONALLY
HAVE YOU HAD OR DO YOU HAVE A STAPH INFECTION: NO
ARE YOU ON BIRTH CONTROL: NO
ARE YOU AN ORGAN TRANSPLANT RECIPIENT: NO
DO YOU HAVE IRREGULAR MENSTRUAL CYCLES: NO
HAVE YOU HAD OR DO YOU HAVE VASCULAR DISEASE: NO
DO YOU HAVE ANY NEW OR CHANGING LESIONS: NO

## 2023-10-30 ASSESSMENT — ITCH NUMERIC RATING SCALE: HOW SEVERE IS YOUR ITCHING?: 0

## 2023-10-30 ASSESSMENT — DERMATOLOGY QUALITY OF LIFE (QOL) ASSESSMENT
RATE HOW EMOTIONALLY BOTHERED YOU ARE BY YOUR SKIN PROBLEM (FOR EXAMPLE, WORRY, EMBARRASSMENT, FRUSTRATION): 0 - NEVER BOTHERED
RATE HOW BOTHERED YOU ARE BY EFFECTS OF YOUR SKIN PROBLEMS ON YOUR ACTIVITIES (EG, GOING OUT, ACCOMPLISHING WHAT YOU WANT, WORK ACTIVITIES OR YOUR RELATIONSHIPS WITH OTHERS): 0 - NEVER BOTHERED
WHAT SINGLE SKIN CONDITION LISTED BELOW IS THE PATIENT ANSWERING THE QUALITY-OF-LIFE ASSESSMENT QUESTIONS ABOUT: NONE OF THE ABOVE
RATE HOW BOTHERED YOU ARE BY SYMPTOMS OF YOUR SKIN PROBLEM (EG, ITCHING, STINGING BURNING, HURTING OR SKIN IRRITATION): 0 - NEVER BOTHERED

## 2023-10-30 ASSESSMENT — PATIENT GLOBAL ASSESSMENT (PGA): PATIENT GLOBAL ASSESSMENT: PATIENT GLOBAL ASSESSMENT:  1 - CLEAR

## 2023-10-30 NOTE — PROGRESS NOTES
Subjective     Iwona Downey is a 53 y.o. female who presents for the following: Skin Check.  She states she has noticed several small light spots on her legs over the past 5 months, which are asymptomatic, but have not gone away.    Review of Systems:  No other skin or systemic complaints other than what is documented elsewhere in the note.    The following portions of the chart were reviewed this encounter and updated as appropriate:       Skin Cancer History  No skin cancer on file.    Specialty Problems          Dermatology Problems    Acquired plantar porokeratosis    Contusion of knee, left    Dermatitis    Eczema    Forehead contusion    Lipoma of scalp    Plantar wart of right foot    Skin rash       Past Dermatologic / Past Relevant Medical History:    - history of squamous cell carcinoma on right medial proximal thigh diagnosed on 10/26/20 s/p wide local excision with 5-mm margins by Dr. Valdez on 1/8/21  - seborrheic dermatitis  - no history of melanoma    Family History:    No family history of melanoma or skin cancer    Social History:    The patient works as a  for [employer], previously in [place] and now [place]    Allergies:  Patient has no known allergies.    Current Medications / CAM's:    Current Outpatient Medications:     acetaminophen (Tylenol) 500 mg tablet, Take by mouth every 6 hours. 1-2 tabs, Disp: , Rfl:     albuterol (ProAir HFA) 90 mcg/actuation inhaler, Inhale 2 puffs every 4 hours if needed., Disp: , Rfl:     atorvastatin (Lipitor) 10 mg tablet, Take 1 tablet (10 mg) by mouth once daily at bedtime., Disp: , Rfl:     benzoyl peroxide 5 % external wash, WASH AFFECTED AREAS ONCE DAILY IN THE MORNING, Disp: , Rfl:     cholecalciferol (Vitamin D-3) 50 mcg (2,000 unit) capsule, Take by mouth., Disp: , Rfl:     clindamycin (Cleocin T) 1 % lotion, APPLY TOPICALLY TO AFFECTED AREA TWICE A DAY OR UP TO 3-4 TIMES PER DAY AS NEEDED FOR ACTIVE LESIONS, Disp:  , Rfl:     cyanocobalamin (Vitamin B-12) 1,000 mcg tablet, Take 1 tablet (1,000 mcg) by mouth once daily., Disp: , Rfl:     fluticasone (Flonase) 50 mcg/actuation nasal spray, Administer into each nostril once daily. 1 to 2 sprays, Disp: , Rfl:     fluticasone propion-salmeteroL (Advair Diskus) 250-50 mcg/dose diskus inhaler, Inhale 1 puff every 12 hours. Rinse mouth after each use, Disp: 60 each, Rfl: 11    gabapentin (Neurontin) 300 mg capsule, TAKE 2 CAPSULES (600 MG) BY MOUTH 3 TIMES A DAY., Disp: 180 capsule, Rfl: 1    hydroCHLOROthiazide (HYDRODiuril) 25 mg tablet, Take 1 tablet (25 mg) by mouth once daily., Disp: 90 tablet, Rfl: 1    ketorolac (Toradol) 10 mg tablet, Take by mouth every 8 hours., Disp: , Rfl:     meloxicam (Mobic) 15 mg tablet, Take 1 tablet (15 mg) by mouth once daily., Disp: 90 tablet, Rfl: 1    multivitamin tablet, Take 1 tablet by mouth once daily., Disp: , Rfl:     naproxen (Naprosyn) 250 mg tablet, Take 1 tablet (250 mg) by mouth once daily., Disp: , Rfl:     rizatriptan (Maxalt) 10 mg tablet, 1 TAB EVERY 1-2HR AS NEEDED MIGRAINE, MAX 20MG/24HR PERIOD, Disp: 9 tablet, Rfl: 0    tiZANidine (Zanaflex) 4 mg tablet, Take 1 tablet (4 mg) by mouth every 6 hours if needed for muscle spasms., Disp: , Rfl:     topiramate (Topamax) 50 mg tablet, Take 1 tablet (50 mg) by mouth 2 times a day., Disp: 180 tablet, Rfl: 3    triamcinolone (Kenalog) 0.1 % cream, APPLY  AND RUB  IN A THIN FILM TO AFFECTED AREAS TWICE DAILY.(AM AND PM)., Disp: , Rfl:     valACYclovir (Valtrex) 1 gram tablet, Take 1 tablet (1,000 mg) by mouth every 12 hours., Disp: , Rfl:     venlafaxine XR (Effexor-XR) 37.5 mg 24 hr capsule, ONE EVERY DAY - TAKE ALONG WITH 75MG CAPSULE, Disp: 90 capsule, Rfl: 3    venlafaxine XR (Effexor-XR) 75 mg 24 hr capsule, Take 1 capsule (75 mg) by mouth once daily., Disp: 90 capsule, Rfl: 3    amoxicillin-pot clavulanate (Augmentin) 875-125 mg tablet, TAKE 1 TABLET BY MOUTH EVERY 12 HOURS WITH  MEALS UNTIL FINISHED, Disp: , Rfl:     meclizine (Antivert) 25 mg tablet, Take by mouth 3 times a day as needed., Disp: , Rfl:     naloxone (Narcan) 4 mg/0.1 mL nasal spray, Administer 1 spray (4 mg) into affected nostril(s) if needed for opioid reversal. May repeat every 2-3 minutes if needed, alternating nostrils, until medical assistance becomes available. (Patient not taking: Reported on 10/30/2023), Disp: 2 each, Rfl: 0    turmeric-turmeric root extract 450-50 mg capsule, Take by mouth., Disp: , Rfl:      Objective   Well appearing patient in no apparent distress; mood and affect are within normal limits.    A full examination was performed including scalp, face, eyes, ears, nose, lips, neck, chest, axillae, abdomen, back, bilateral upper extremities, and bilateral lower extremities. All findings within normal limits unless otherwise noted below.    Assessment/Plan   1. Idiopathic guttate hypomelanosis  Right Lower Leg - Anterior  Scattered on her bilateral legs, there are multiple similar-appearing small, hypopigmented, nonscaly macules    Idiopathic guttate hypomelanosis - bilateral legs.  The benign nature of this condition related to chronic sun exposure and photodamage was discussed with the patient today and reassurance provided.  I emphasized the importance of sun avoidance and sun protection with daily sunscreen use, but no treatment is indicated for these lesions at this time.  She expressed understanding and a sense of reassurance and is in agreement with this plan.    2. Acne vulgaris  Head - Anterior (Face)  Scattered on the patient's face, there are a few open and closed comedones; a few erythematous, inflammatory papules; and a few hyperpigmented macules consistent with post-inflammatory hyperpigmentation    Acne Vulgaris - mild flare on face; mainly inflammatory type.  The nature of this condition and treatment options were discussed extensively with the patient today.  At this time, I recommend  combination topical therapy with Benzoyl Peroxide 5% creamy wash once or twice daily and Clindamycin 1% lotion twice daily or up to 3-4 times per day as needed for active lesions.  The risks, benefits, and side effects of these medications, including the redness, dryness, and irritation expected with BP use, were discussed.  I also informed the patient it will likely take at least 2-3 months to notice any significant improvement with the treatment regimen outlined above.  She expressed understanding and is in agreement with this plan.    3. Melanocytic nevus of trunk  Scattered on the patient's face, neck, trunk, and extremities, there are multiple small, round- to oval-shaped, brown-pigmented and pink-colored, symmetric, uniform-appearing macules and dome-shaped papules    Clinically benign- to slightly atypical-appearing nevi - the clinically benign- to slightly atypical-appearing nature of the patient's nevi was discussed with the patient today.  None of the patient's nevi meet threshold for biopsy today.  I emphasized the importance of performing monthly self-skin exams using the ABCDs of monitoring moles, which were reviewed with the patient today and an informational hand-out provided.  I also emphasized the importance of sun avoidance and sun protection with daily sunscreen use.  The patient expressed understanding and is in agreement with this plan.    4. Lentigo  Photodistributed  Multiple tan- to light brown-colored, round- to oval-shaped, symmetric and uniform-appearing macules and small patches consistent with lentigines scattered in sun-exposed areas.    Solar Lentigines and photodamage.  The clinically benign-appearing nature of these lesions and their relation to chronic sun exposure were discussed with the patient today and reassurance provided.  None of these lesions meet threshold for biopsy today, and thus no treatment is medically indicated for these lesions at this time.  The signs and symptoms  of skin cancer were reviewed and the patient was advised to practice sun protection and sun avoidance, use daily sunscreen, and perform regular self skin exams.  The patient was instructed to monitor these lesions for any changes, such as in size, shape, or color, or associated symptoms and to call our office to schedule a return visit for re-evaluation if any such changes or symptoms are noticed in the future.  The patient expressed understanding and is in agreement with this plan.    5. History of nonmelanoma skin cancer  On the patient's right medial proximal thigh, there is a well-healed scar with no evidence of recurrent growth on exam today.    History of squamous cell carcinoma and photodamage.  There is no evidence of recurrence at the site of the patient's previously treated SCC on her right medial proximal thigh on exam today.  The signs and symptoms of skin cancer were reviewed and the patient was advised to practice sun protection and sun avoidance, use daily sunscreen, and perform regular self skin exams.  I will have the patient return to our office in 1 year for routine follow-up and skin exam, and the patient was instructed to call our office should the patient notice any new, changing, symptomatic, or otherwise concerning skin lesions before then.  The patient expressed understanding and is in agreement with this plan.    6. Diffuse photodamage of skin  Photodistributed  Diffuse photodamage with actinic changes with telangiectasia and mottled pigmentation in sun-exposed areas.    Photodamage.  The signs and symptoms of skin cancer were reviewed and the patient was advised to practice sun protection and sun avoidance, use daily sunscreen, and perform regular self skin exams.  Sun protection was discussed, including avoiding the mid-day sun, wearing a sunscreen with SPF at least 50, and stressing the need for reapplication of sunscreen and applying more than they think they need.

## 2023-11-02 ENCOUNTER — OFFICE VISIT (OUTPATIENT)
Dept: PRIMARY CARE | Facility: CLINIC | Age: 53
End: 2023-11-02
Payer: COMMERCIAL

## 2023-11-02 VITALS
BODY MASS INDEX: 38.62 KG/M2 | HEIGHT: 65 IN | OXYGEN SATURATION: 93 % | WEIGHT: 231.8 LBS | DIASTOLIC BLOOD PRESSURE: 76 MMHG | SYSTOLIC BLOOD PRESSURE: 125 MMHG | HEART RATE: 104 BPM

## 2023-11-02 DIAGNOSIS — M47.22 OSTEOARTHRITIS OF SPINE WITH RADICULOPATHY, CERVICAL REGION: Primary | ICD-10-CM

## 2023-11-02 DIAGNOSIS — J41.0 SIMPLE CHRONIC BRONCHITIS (MULTI): ICD-10-CM

## 2023-11-02 DIAGNOSIS — I10 PRIMARY HYPERTENSION: ICD-10-CM

## 2023-11-02 DIAGNOSIS — Z98.1 S/P CERVICAL SPINAL FUSION: ICD-10-CM

## 2023-11-02 DIAGNOSIS — Z12.31 VISIT FOR SCREENING MAMMOGRAM: ICD-10-CM

## 2023-11-02 DIAGNOSIS — R09.81 CHRONIC NASAL CONGESTION: ICD-10-CM

## 2023-11-02 PROCEDURE — 4004F PT TOBACCO SCREEN RCVD TLK: CPT | Performed by: STUDENT IN AN ORGANIZED HEALTH CARE EDUCATION/TRAINING PROGRAM

## 2023-11-02 PROCEDURE — 3074F SYST BP LT 130 MM HG: CPT | Performed by: STUDENT IN AN ORGANIZED HEALTH CARE EDUCATION/TRAINING PROGRAM

## 2023-11-02 PROCEDURE — 3078F DIAST BP <80 MM HG: CPT | Performed by: STUDENT IN AN ORGANIZED HEALTH CARE EDUCATION/TRAINING PROGRAM

## 2023-11-02 PROCEDURE — 99214 OFFICE O/P EST MOD 30 MIN: CPT | Performed by: STUDENT IN AN ORGANIZED HEALTH CARE EDUCATION/TRAINING PROGRAM

## 2023-11-02 RX ORDER — FLUTICASONE PROPIONATE 50 MCG
1 SPRAY, SUSPENSION (ML) NASAL
Qty: 16 G | Refills: 2 | Status: SHIPPED | OUTPATIENT
Start: 2023-11-02 | End: 2023-11-27

## 2023-11-02 RX ORDER — TRAMADOL HYDROCHLORIDE 50 MG/1
50 TABLET ORAL DAILY
Qty: 30 TABLET | Refills: 0 | Status: SHIPPED | OUTPATIENT
Start: 2023-11-02 | End: 2023-12-02

## 2023-11-02 NOTE — LETTER
November 2, 2023     Patient: Iwona Downey   YOB: 1970   Date of Visit: 11/2/2023       To Whom It May Concern:    Iwona Downey was seen in my clinic on 11/2/2023 at 10:30 am. Please excuse Iwona for her absence from work on this day to make the appointment.    If you have any questions or concerns, please don't hesitate to call.         Sincerely,         Clara Wharton MD        CC: No Recipients

## 2023-11-02 NOTE — PATIENT INSTRUCTIONS
Hold off on the tizanidine and let me know how you did without this when you return in        We are on a new system that is paperless.     Lab location for blood work :  1. Located across the office , just past the elevators .   2. [address].  If you are coming on a Saturday, go to [address] for the blood draw    Radiology : [address] for Xrays  You can also schedule non urgent imaging by calling .     For scheduling appts, call . You might be receiving call from central scheduling as well.    For scheduling colonoscopy, call .     For scheduling physical therapy, call [phone] ( [phone]).    For any other scheduling questions or locations, please ask the medical assistant or the .

## 2023-11-02 NOTE — PROGRESS NOTES
"Subjective   Patient ID: Iwona Downey is a 53 y.o. female who presents for Follow-up (3 month follow-up. ).        HPI    52 y/o female with asthma, current every day smoker, HTN, migraine HA ,  h/o  intractable cervical radiculopathy secondary to cervical stenosis s/p C5-7 ACDF ( April 2022), mild DJD of SI joints , DJD of knee joints ( managed at f , no recent Xrays to review ) and hips      Fu for chronic pain     Note from the last visit :   She is on the following regimen for pain control   Gabapentin 600mg TID   Topomax 50mg BID   Meloxicam 15mg every day  Tramadol 50mg every day  Tizanidine 4mg BID   Flexeril 10mg every day    As of last visit on 8/31/23, flexeril was dced     Set a quit date for smoking  for end of this month     Visit Vitals  /76   Pulse 104   Ht 1.651 m (5' 5\")   Wt 105 kg (231 lb 12.8 oz)   SpO2 93%   BMI 38.57 kg/m²   Smoking Status Every Day   BSA 2.19 m²      No LMP recorded.     Review of Systems    Constitutional : No feeling poorly / fevers/ chills / night sweats/ fatigue   Cardiovascular : No CP /Palpitations/ lower extremity edema / syncope   Respiratory : No Cough /MONTIEL/Dyspnea at rest   Gastrointestinal : No abd pain / N/V  No bloody stools/ melena / constipation  Endo : No polyuria/polydipsia/ muscle weakness / sluggishness   CNS: No confusion / HA/ tingling/ numbness/ weakness of extremities  Psychiatric: No anxiety/ depression/ SI/HI    All other systems have been reviewed and are negative for complaint       Physical Exam    Constitutional : Vitals reviewed. Alert and in no distress  Cardiovascular : RRR, Normal S1, S2, No pericardial rub/ gallop, no peripheral edema   Pulmonary: No respiratory distress, CTAB   MSK : Normal gait and station , strength and tone     Neurologic : CNs 2-12 grossly intact , no obvious FNDs  Psych : A,Ox3, normal mood and affect      Assessment/Plan   Diagnoses and all orders for this visit:  Osteoarthritis of spine with radiculopathy, " cervical region  -     traMADol (Ultram) 50 mg tablet; Take 1 tablet (50 mg) by mouth once daily. , as needed for pain  Simple chronic bronchitis (CMS/HCC)  S/P cervical spinal fusion  -     traMADol (Ultram) 50 mg tablet; Take 1 tablet (50 mg) by mouth once daily. , as needed for pain  Chronic nasal congestion  -     fluticasone (Flonase) 50 mcg/actuation nasal spray; Administer 1 spray into each nostril once daily. 1 to 2 sprays  Visit for screening mammogram  -     BI mammo bilateral screening tomosynthesis; Future  Primary hypertension  -     CBC; Future  -     Comprehensive Metabolic Panel; Future  -     Hemoglobin A1C; Future  -     Lipid Panel; Future  -     TSH with reflex to Free T4 if abnormal; Future      Fu for chronic pain       She is on the following regimen for pain control   Gabapentin 600mg TID   Topomax 50mg BID   Meloxicam 15mg every day  Tramadol 50mg every day  Tizanidine 4mg BID       As of last visit on 8/31/23, flexeril was dced   Choice given to pt if she would like to withhold any of the meds listed above . She chose to hold Tizanidine     After the visit , pt reportedly asked MA for FMLA / she will need to visit to discuss this further       Conditions addressed and mgmt as noted above.  Pertinent labs, images/ imaging reports , chart review was done .   Age appropriate labs / labs for mgmt of chronic medical conditions ordered, further mgmt pending the results.         RTO in Jan for CPE

## 2023-11-17 DIAGNOSIS — G43.911 INTRACTABLE MIGRAINE WITH STATUS MIGRAINOSUS, UNSPECIFIED MIGRAINE TYPE: ICD-10-CM

## 2023-11-17 RX ORDER — RIZATRIPTAN BENZOATE 10 MG/1
TABLET ORAL
Qty: 9 TABLET | Refills: 0 | OUTPATIENT
Start: 2023-11-17

## 2023-11-17 RX ORDER — RIZATRIPTAN BENZOATE 10 MG/1
TABLET ORAL
Qty: 9 TABLET | Refills: 0 | Status: SHIPPED | OUTPATIENT
Start: 2023-11-17 | End: 2023-12-21

## 2023-11-27 DIAGNOSIS — M79.7 FIBROMYALGIA: ICD-10-CM

## 2023-11-27 DIAGNOSIS — R09.81 CHRONIC NASAL CONGESTION: ICD-10-CM

## 2023-11-27 RX ORDER — FLUTICASONE PROPIONATE 50 MCG
SPRAY, SUSPENSION (ML) NASAL
Qty: 48 ML | Refills: 3 | Status: SHIPPED | OUTPATIENT
Start: 2023-11-27

## 2023-11-28 RX ORDER — GABAPENTIN 300 MG/1
600 CAPSULE ORAL 3 TIMES DAILY
Qty: 180 CAPSULE | Refills: 1 | Status: SHIPPED | OUTPATIENT
Start: 2023-11-28 | End: 2024-02-26

## 2023-12-17 DIAGNOSIS — G43.911 INTRACTABLE MIGRAINE WITH STATUS MIGRAINOSUS, UNSPECIFIED MIGRAINE TYPE: ICD-10-CM

## 2023-12-21 ENCOUNTER — OFFICE VISIT (OUTPATIENT)
Dept: PRIMARY CARE | Facility: CLINIC | Age: 53
End: 2023-12-21
Payer: COMMERCIAL

## 2023-12-21 ENCOUNTER — APPOINTMENT (OUTPATIENT)
Dept: RADIOLOGY | Facility: CLINIC | Age: 53
End: 2023-12-21
Payer: COMMERCIAL

## 2023-12-21 VITALS
OXYGEN SATURATION: 93 % | DIASTOLIC BLOOD PRESSURE: 81 MMHG | BODY MASS INDEX: 38.29 KG/M2 | HEIGHT: 65 IN | SYSTOLIC BLOOD PRESSURE: 128 MMHG | HEART RATE: 97 BPM | WEIGHT: 229.8 LBS

## 2023-12-21 DIAGNOSIS — G43.109 MIGRAINE WITH AURA AND WITHOUT STATUS MIGRAINOSUS, NOT INTRACTABLE: Primary | ICD-10-CM

## 2023-12-21 DIAGNOSIS — Z02.89 ENCOUNTER FOR COMPLETION OF FORM WITH PATIENT: ICD-10-CM

## 2023-12-21 DIAGNOSIS — E78.2 MIXED HYPERLIPIDEMIA: ICD-10-CM

## 2023-12-21 DIAGNOSIS — J20.9 ACUTE BRONCHITIS, UNSPECIFIED ORGANISM: ICD-10-CM

## 2023-12-21 DIAGNOSIS — J45.40 MODERATE PERSISTENT ASTHMA WITHOUT COMPLICATION (HHS-HCC): ICD-10-CM

## 2023-12-21 PROCEDURE — 3079F DIAST BP 80-89 MM HG: CPT | Performed by: STUDENT IN AN ORGANIZED HEALTH CARE EDUCATION/TRAINING PROGRAM

## 2023-12-21 PROCEDURE — 3074F SYST BP LT 130 MM HG: CPT | Performed by: STUDENT IN AN ORGANIZED HEALTH CARE EDUCATION/TRAINING PROGRAM

## 2023-12-21 PROCEDURE — 4004F PT TOBACCO SCREEN RCVD TLK: CPT | Performed by: STUDENT IN AN ORGANIZED HEALTH CARE EDUCATION/TRAINING PROGRAM

## 2023-12-21 PROCEDURE — 99214 OFFICE O/P EST MOD 30 MIN: CPT | Performed by: STUDENT IN AN ORGANIZED HEALTH CARE EDUCATION/TRAINING PROGRAM

## 2023-12-21 RX ORDER — RIZATRIPTAN BENZOATE 10 MG/1
TABLET ORAL
Qty: 9 TABLET | Refills: 11 | Status: SHIPPED | OUTPATIENT
Start: 2023-12-21 | End: 2024-04-13 | Stop reason: WASHOUT

## 2023-12-21 RX ORDER — ALBUTEROL SULFATE 90 UG/1
2 AEROSOL, METERED RESPIRATORY (INHALATION) EVERY 4 HOURS PRN
Qty: 18 G | Refills: 3 | Status: SHIPPED | OUTPATIENT
Start: 2023-12-21

## 2023-12-21 RX ORDER — ATORVASTATIN CALCIUM 10 MG/1
10 TABLET, FILM COATED ORAL NIGHTLY
Qty: 90 TABLET | Refills: 3 | Status: SHIPPED | OUTPATIENT
Start: 2023-12-21

## 2023-12-21 RX ORDER — AZITHROMYCIN 250 MG/1
TABLET, FILM COATED ORAL
Qty: 6 TABLET | Refills: 0 | Status: SHIPPED | OUTPATIENT
Start: 2023-12-21 | End: 2024-01-04 | Stop reason: ALTCHOICE

## 2023-12-21 NOTE — PROGRESS NOTES
"Subjective   Patient ID: Iwona Downey is a 53 y.o. female who presents for Follow-up (Discuss FMLA for headaches. ).        HPI  54 y/o female with asthma, current every day smoker, HTN, migraine HA ,  h/o  intractable cervical radiculopathy secondary to cervical stenosis s/p C5-7 ACDF ( April 2022), mild DJD of SI joints , DJD of knee joints ( managed at f , no recent Xrays to review ) and hips      Here for FMLA   She experienced HA all week this week but still showed up for week   Usual frequency : once a month  She is on Rizatriptan and Topiramate   Works as a      Ceased to smoke for a week and then resumed due to \" stress \" from daughter and grand kids   Experiencing productive cough for 2 weeks now     At the last visit we discussed , tapering/dcing tizanidine   She is on the following regimen for pain control   Gabapentin 600mg TID   Topomax 50mg BID   Meloxicam 15mg every day  Tramadol 50mg every day  Tizanidine 4mg BID   Flexeril 10mg every day---- dced 8/31/23  Visit Vitals  /81   Pulse 97   Ht 1.651 m (5' 5\")   Wt 104 kg (229 lb 12.8 oz)   SpO2 93%   BMI 38.24 kg/m²   Smoking Status Every Day   BSA 2.18 m²      No LMP recorded.     Review of Systems    Constitutional : No feeling poorly / fevers/ chills / night sweats/ fatigue   Cardiovascular : No CP /Palpitations/ lower extremity edema / syncope   Respiratory : No Cough /MONTIEL/Dyspnea at rest     CNS: No confusion / HA/ tingling/ numbness/ weakness of extremities  Psychiatric: No anxiety/ depression/ SI/HI    All other systems have been reviewed and are negative for complaint       Physical Exam    Constitutional : Vitals reviewed. Alert and in no distress  Cardiovascular : RRR, Normal S1, S2, No pericardial rub/ gallop, no peripheral edema   Pulmonary: No respiratory distress, CTAB     Neurologic : CNs 2-12 grossly intact , no obvious FNDs  Psych : A,Ox3, normal mood and affect      Assessment/Plan   Diagnoses and all orders for " this visit:  Migraine with aura and without status migrainosus, not intractable  Encounter for completion of form with patient  Acute bronchitis, unspecified organism  -     azithromycin (Zithromax) 250 mg tablet; Take 2 tablets (500 mg) by mouth once daily for 1 day, THEN 1 tablet (250 mg) once daily for 4 days. Take 2 tabs (500 mg) by mouth today, than 1 daily for 4 days..  Moderate persistent asthma without complication  -     albuterol (ProAir HFA) 90 mcg/actuation inhaler; Inhale 2 puffs every 4 hours if needed for shortness of breath.  Mixed hyperlipidemia  -     atorvastatin (Lipitor) 10 mg tablet; Take 1 tablet (10 mg) by mouth once daily at bedtime.      52 y/o female with asthma, current every day smoker, HTN, migraine HA ,  h/o  intractable cervical radiculopathy secondary to cervical stenosis s/p C5-7 ACDF ( April 2022), mild DJD of SI joints , DJD of knee joints ( managed at [facility] , no recent Xrays to review ) and hips        FMLA completed for migraine HAS, intermittent episodes of acute bronchitis due to  continued smoking and asthma     Smoking cessation readdressed     Conditions addressed and mgmt as noted above.  Pertinent labs, images/ imaging reports , chart review was done .       RTO as previously advised

## 2023-12-22 ENCOUNTER — ANCILLARY PROCEDURE (OUTPATIENT)
Dept: RADIOLOGY | Facility: CLINIC | Age: 53
End: 2023-12-22
Payer: COMMERCIAL

## 2023-12-22 DIAGNOSIS — Z12.31 VISIT FOR SCREENING MAMMOGRAM: ICD-10-CM

## 2023-12-22 PROCEDURE — 77067 SCR MAMMO BI INCL CAD: CPT

## 2023-12-22 PROCEDURE — 77067 SCR MAMMO BI INCL CAD: CPT | Performed by: RADIOLOGY

## 2023-12-22 PROCEDURE — 77063 BREAST TOMOSYNTHESIS BI: CPT | Performed by: RADIOLOGY

## 2024-01-01 DIAGNOSIS — R92.8 ABNORMALITY OF RIGHT BREAST ON SCREENING MAMMOGRAM: Primary | ICD-10-CM

## 2024-01-04 ENCOUNTER — OFFICE VISIT (OUTPATIENT)
Dept: PRIMARY CARE | Facility: CLINIC | Age: 54
End: 2024-01-04
Payer: COMMERCIAL

## 2024-01-04 VITALS
WEIGHT: 237.8 LBS | DIASTOLIC BLOOD PRESSURE: 87 MMHG | OXYGEN SATURATION: 99 % | HEART RATE: 80 BPM | BODY MASS INDEX: 39.62 KG/M2 | HEIGHT: 65 IN | SYSTOLIC BLOOD PRESSURE: 139 MMHG

## 2024-01-04 DIAGNOSIS — I10 PRIMARY HYPERTENSION: ICD-10-CM

## 2024-01-04 DIAGNOSIS — E78.2 MIXED HYPERLIPIDEMIA: ICD-10-CM

## 2024-01-04 DIAGNOSIS — J45.40 MODERATE PERSISTENT ASTHMA WITHOUT COMPLICATION (HHS-HCC): ICD-10-CM

## 2024-01-04 DIAGNOSIS — Z00.00 ROUTINE GENERAL MEDICAL EXAMINATION AT A HEALTH CARE FACILITY: Primary | ICD-10-CM

## 2024-01-04 DIAGNOSIS — Z71.6 ENCOUNTER FOR SMOKING CESSATION COUNSELING: ICD-10-CM

## 2024-01-04 DIAGNOSIS — M47.812 OSTEOARTHRITIS OF CERVICAL SPINE, UNSPECIFIED SPINAL OSTEOARTHRITIS COMPLICATION STATUS: ICD-10-CM

## 2024-01-04 DIAGNOSIS — Z12.11 SCREENING FOR COLON CANCER: ICD-10-CM

## 2024-01-04 PROBLEM — R55 VASOVAGAL SYNCOPE: Status: RESOLVED | Noted: 2023-04-09 | Resolved: 2024-01-04

## 2024-01-04 PROBLEM — R07.9 CHEST PAIN: Status: RESOLVED | Noted: 2023-04-09 | Resolved: 2024-01-04

## 2024-01-04 PROBLEM — J41.0 SIMPLE CHRONIC BRONCHITIS (MULTI): Status: RESOLVED | Noted: 2023-11-02 | Resolved: 2024-01-04

## 2024-01-04 PROCEDURE — 3075F SYST BP GE 130 - 139MM HG: CPT | Performed by: STUDENT IN AN ORGANIZED HEALTH CARE EDUCATION/TRAINING PROGRAM

## 2024-01-04 PROCEDURE — 99214 OFFICE O/P EST MOD 30 MIN: CPT | Performed by: STUDENT IN AN ORGANIZED HEALTH CARE EDUCATION/TRAINING PROGRAM

## 2024-01-04 PROCEDURE — 3079F DIAST BP 80-89 MM HG: CPT | Performed by: STUDENT IN AN ORGANIZED HEALTH CARE EDUCATION/TRAINING PROGRAM

## 2024-01-04 PROCEDURE — 99396 PREV VISIT EST AGE 40-64: CPT | Performed by: STUDENT IN AN ORGANIZED HEALTH CARE EDUCATION/TRAINING PROGRAM

## 2024-01-04 NOTE — PROGRESS NOTES
"  Subjective     Patient ID: Iwona Downey is a 53 y.o. female who presents for Annual Exam (CPE.).      Last Physical : ___Years ago     Pt's PMH, PSH, SH, FH , meds and allergies was obtained / reviewed and updated .     Dental visits : Y  Vision issues : N  Hearing issues : N    Immunizations : Y    Diet :  could be better  Exercise:  Weight concerns :     Alcohol: as noted in   Tobacco: as noted in   Recreational drug use : None/ as noted in       ======================================================    Visit Vitals  /87   Pulse 80   Ht 1.651 m (5' 5\")   Wt 108 kg (237 lb 12.8 oz)   SpO2 99%   BMI 39.57 kg/m²   OB Status Hysterectomy   Smoking Status Every Day   BSA 2.23 m²      No LMP recorded. Patient has had a hysterectomy.     =====================================    Review of systems:  Constitutional: no chills, no fever and no night sweats.     Eyes: no blurred vision and no eyesight problems.     ENT: no hearing loss, no nasal congestion, no nasal discharge, no hoarseness and no sore throat.     Cardiovascular: no chest pain, no intermittent leg claudication, no lower extremity edema, no palpitations and no syncope.     Respiratory: no cough, no shortness of breath during exertion, no shortness of breath at rest and no wheezing.     Gastrointestinal: no abdominal pain, no blood in stools, no constipation, no diarrhea, no melena, no nausea, no rectal pain and no vomiting.     Genitourinary: no dysuria, no change in urinary frequency, no urinary hesitancy, no feelings of urinary urgency and no vaginal discharge.     Musculoskeletal: no arthralgias, no back pain and no myalgias.     Integumentary: no new skin lesions and no rashes.     Neurological: no difficulty walking, no headache, no limb weakness, no numbness and no tingling.     Psychiatric: no anxiety, no depression, no anhedonia and no substance use disorders.   ============================================================    Physical " exam :    Constitutional: Alert and in no acute distress. Well developed, well nourished.     Eyes: Normal external exam. Pupils were equal in size, round, reactive to light (PERRL) with normal accommodation and extraocular movements intact (EOMI).     Ears, Nose, Mouth, and Throat: External inspection of ears and nose: Normal.  Otoscopic examination: Normal.      Neck: No neck mass was observed. Supple.     Cardiovascular: Heart rate and rhythm were normal, normal S1 and S2, no gallops, no murmurs and no pericardial rub    Pulmonary: No respiratory distress. Clear bilateral breath sounds.     Abdomen: Soft nontender; no abdominal mass palpated. No organomegaly.     Musculoskeletal: No joint swelling seen, normal movements of all extremities. Range of motion: Normal.  Muscle strength/tone: Normal.      Neurologic: Deep tendon reflexes were 2+ and symmetric. Sensation: Normal.     Psychiatric: Judgment and insight: Intact. Mood and affect: Normal.        Assessment/Plan    Diagnoses and all orders for this visit:  Routine general medical examination at a health care facility  Screening for colon cancer  -     Colonoscopy Screening; Average Risk Patient; Future  Encounter for smoking cessation counseling  -     Referral to Tobacco Cessation Counseling; Future  Moderate persistent asthma without complication  Osteoarthritis of cervical spine, unspecified spinal osteoarthritis complication status  -     Referral to Pain Medicine; Future  Mixed hyperlipidemia  Primary hypertension      52 y/o female with asthma, current every day smoker, HTN, migraine HA ,  h/o  intractable cervical radiculopathy secondary to cervical stenosis s/p C5-7 ACDF ( April 2022), mild DJD of SI joints , DJD of knee joints ( managed at [facility] , no recent Xrays to review ) and hips       HTN: at goal   Asthma : stable on the current regimen   Depression , fibromyalgia : on medical mgmt   Migraine HA ; stable on this regimen   HPL : on statin        DJD of multiple joints , on multiple pain meds   She is on the following regimen for pain control   Gabapentin 600mg TID   Topomax 50mg BID   Meloxicam 15mg every day  Tramadol 50mg every day  Tizanidine 4mg BID   Flexeril 10mg every day---- dced 8/31/23    HM :   Vaccines:  -Tdap : due 2028   -Flu : UTD  -Shingles :  UTD    Cancer screenings:  -Mammogram :  current, needs additional imaging   -Colonoscopy:  due 2024, ordered  -PAP : s/p hysterectomy    General preventive care discussed which includes regular exercise, healthy eating habits, to include more of plant based diet, to include foods of all colors  , limiting excessive red meat intake , staying active to maintain a weight that is appropriate for his/ her height / making efforts to reach an ideal weight for height,  avoidance of smoking, excess alcohol consumption, avoidance of recreational drugs, safe and responsible sexual relationships and practices.     Calcium + Vit D supplements recommended   Regular exercise recommended   Healthy eating choices discussed and recommended   BMI ( Body mass index ) discussed and encouraged weight loss through the above discussed lifestyle modifications .     Conditions addressed and mgmt as noted above.  Pertinent labs, images/ imaging reports , chart review was done .     Labs to be done soon

## 2024-01-09 ENCOUNTER — LAB (OUTPATIENT)
Dept: LAB | Facility: LAB | Age: 54
End: 2024-01-09
Payer: COMMERCIAL

## 2024-01-09 ENCOUNTER — OFFICE VISIT (OUTPATIENT)
Dept: ORTHOPEDIC SURGERY | Facility: CLINIC | Age: 54
End: 2024-01-09
Payer: COMMERCIAL

## 2024-01-09 DIAGNOSIS — G56.02 CARPAL TUNNEL SYNDROME OF LEFT WRIST: Primary | ICD-10-CM

## 2024-01-09 DIAGNOSIS — I10 PRIMARY HYPERTENSION: ICD-10-CM

## 2024-01-09 LAB
ALBUMIN SERPL BCP-MCNC: 3.7 G/DL (ref 3.4–5)
ALP SERPL-CCNC: 75 U/L (ref 33–110)
ALT SERPL W P-5'-P-CCNC: 16 U/L (ref 7–45)
ANION GAP SERPL CALC-SCNC: 10 MMOL/L (ref 10–20)
AST SERPL W P-5'-P-CCNC: 21 U/L (ref 9–39)
BILIRUB SERPL-MCNC: 0.6 MG/DL (ref 0–1.2)
BUN SERPL-MCNC: 17 MG/DL (ref 6–23)
CALCIUM SERPL-MCNC: 8.6 MG/DL (ref 8.6–10.3)
CHLORIDE SERPL-SCNC: 106 MMOL/L (ref 98–107)
CHOLEST SERPL-MCNC: 142 MG/DL (ref 0–199)
CHOLESTEROL/HDL RATIO: 2.9
CO2 SERPL-SCNC: 27 MMOL/L (ref 21–32)
CREAT SERPL-MCNC: 0.75 MG/DL (ref 0.5–1.05)
EGFRCR SERPLBLD CKD-EPI 2021: >90 ML/MIN/1.73M*2
ERYTHROCYTE [DISTWIDTH] IN BLOOD BY AUTOMATED COUNT: 15.5 % (ref 11.5–14.5)
EST. AVERAGE GLUCOSE BLD GHB EST-MCNC: 108 MG/DL
GLUCOSE SERPL-MCNC: 90 MG/DL (ref 74–99)
HBA1C MFR BLD: 5.4 %
HCT VFR BLD AUTO: 41.8 % (ref 36–46)
HDLC SERPL-MCNC: 49.1 MG/DL
HGB BLD-MCNC: 13.5 G/DL (ref 12–16)
LDLC SERPL CALC-MCNC: 82 MG/DL
MCH RBC QN AUTO: 30.7 PG (ref 26–34)
MCHC RBC AUTO-ENTMCNC: 32.3 G/DL (ref 32–36)
MCV RBC AUTO: 95 FL (ref 80–100)
NON HDL CHOLESTEROL: 93 MG/DL (ref 0–149)
NRBC BLD-RTO: 0 /100 WBCS (ref 0–0)
PLATELET # BLD AUTO: 354 X10*3/UL (ref 150–450)
POTASSIUM SERPL-SCNC: 4.4 MMOL/L (ref 3.5–5.3)
PROT SERPL-MCNC: 6.8 G/DL (ref 6.4–8.2)
RBC # BLD AUTO: 4.4 X10*6/UL (ref 4–5.2)
SODIUM SERPL-SCNC: 139 MMOL/L (ref 136–145)
TRIGL SERPL-MCNC: 55 MG/DL (ref 0–149)
TSH SERPL-ACNC: 2.45 MIU/L (ref 0.44–3.98)
VLDL: 11 MG/DL (ref 0–40)
WBC # BLD AUTO: 12.6 X10*3/UL (ref 4.4–11.3)

## 2024-01-09 PROCEDURE — 84443 ASSAY THYROID STIM HORMONE: CPT

## 2024-01-09 PROCEDURE — 2500000004 HC RX 250 GENERAL PHARMACY W/ HCPCS (ALT 636 FOR OP/ED): Performed by: ORTHOPAEDIC SURGERY

## 2024-01-09 PROCEDURE — 99213 OFFICE O/P EST LOW 20 MIN: CPT | Performed by: ORTHOPAEDIC SURGERY

## 2024-01-09 PROCEDURE — 99213 OFFICE O/P EST LOW 20 MIN: CPT | Mod: GC | Performed by: ORTHOPAEDIC SURGERY

## 2024-01-09 PROCEDURE — 85027 COMPLETE CBC AUTOMATED: CPT

## 2024-01-09 PROCEDURE — 2500000005 HC RX 250 GENERAL PHARMACY W/O HCPCS: Performed by: ORTHOPAEDIC SURGERY

## 2024-01-09 PROCEDURE — 80053 COMPREHEN METABOLIC PANEL: CPT

## 2024-01-09 PROCEDURE — 83036 HEMOGLOBIN GLYCOSYLATED A1C: CPT

## 2024-01-09 PROCEDURE — 80061 LIPID PANEL: CPT

## 2024-01-09 PROCEDURE — 20526 THER INJECTION CARP TUNNEL: CPT | Performed by: ORTHOPAEDIC SURGERY

## 2024-01-09 PROCEDURE — 36415 COLL VENOUS BLD VENIPUNCTURE: CPT

## 2024-01-09 RX ADMIN — LIDOCAINE HYDROCHLORIDE 0.75 ML: 10 INJECTION, SOLUTION INFILTRATION; PERINEURAL at 17:08

## 2024-01-09 RX ADMIN — TRIAMCINOLONE ACETONIDE 30 MG: 40 INJECTION, SUSPENSION INTRA-ARTICULAR; INTRAMUSCULAR at 17:08

## 2024-01-09 NOTE — PROGRESS NOTES
CHIEF COMPLAINT         Hand pain and tingling    ASSESSMENT + PLAN    Bilateral carpal tunnel syndrome, worse on the left    Prior CSI provided 10 months of great relief. At this point she continues to see relief with CSI. She has been able to return to work as a  with conservative treatment. She is still smoking, but working on cessation, which would preclude her from any surgery at the moment. But since the injections continue to provided long term relief she would like to continue with them. L carpal tunnel injection offered and received today in clinic. Discussed the importance of continued smoking cessation. Recommend follow up with Dr Cosby regarding her L triceps numbness as she is s/p spine surgery with him. Follow up prn for repeat injections or to discuss surgery if she quits smoking    HISTORY OF PRESENT ILLNESS       Patient returns today for follow up of her bilateral CTS. Last CSI in February 2023, provided 10 months of relief to L CTS symptoms.  The right side is still essentially asymptomatic.  Has returned to work as . Symptoms worse with repetitive movements. Wearing wrist splints while working, not at night. Also reporting some LUE numbness along triceps with some C7 weakness.    She is not diabetic or hypothyroid.  She does continue to smoke, down to 1/4 pack a day.    PHYSICAL EXAM    She remains well-developed, well-nourished morbidly obese female in no acute distress.  She appears her stated age and has a pleasant affect.    LUE:   -Pos tinels after one tap  -Motor intact in axillary/AIN/PIN/ulnar distributions  -SILT axillary/radial/median/ulnar distributions  -Hand wwp, 2+ radial pulse, cap refill brisk  -Compartments soft and compressible, no pain with passive stretch of digits  5/5 APB and hand intrinsics with no wasting today.  No pathologic ulnar motor signs.  Negative elbow flexion test.  Positive Durkan but negative Phalen.    RUE:   -Pos tinels after 5  seconds  -Motor intact in axillary/AIN/PIN/ulnar distributions  -SILT axillary/radial/median/ulnar distributions  -Hand wwp, 2+ radial pulse, cap refill brisk  -Compartments soft and compressible, no pain with passive stretch of digits    IMAGING / LABS / EMGs    None today    Kemal Nunez MD  Orthopaedic Surgery PGY-2      PROCEDURE    Hand / UE Inj/Asp: L carpal tunnel for carpal tunnel syndrome on 1/9/2024 5:08 PM  Indications: therapeutic and diagnostic  Details: 25 G needle, volar approach  Medications: 30 mg triamcinolone acetonide 40 mg/mL; 0.75 mL lidocaine 10 mg/mL (1 %)  Outcome: tolerated well, no immediate complications  Procedure, treatment alternatives, risks and benefits explained, specific risks discussed. Consent was given by the patient.           ATTESTATION    I saw and evaluated the patient. I personally obtained the key and critical portions of the history and physical exam or was physically present for key and critical portions performed by the resident/fellow. I reviewed the resident/fellow's documentation and discussed the patient with the resident/fellow. I agree with the resident/fellow's medical decision making as documented in the note.        Electronically signed  DARLENE Ovalles MD  [phone]

## 2024-01-09 NOTE — LETTER
February 3, 2024     Clara Wharton MD  [address]  [address]  [facility] [address]    Patient: Iwona Downey   YOB: 1970   Date of Visit: 1/9/2024       Dear Dr. Clara Wharton MD:    Thank you for referring Iwona Downey to me for evaluation. Below are my notes for this consultation.  If you have questions, please do not hesitate to call me. I look forward to following your patient along with you.       Sincerely,     Chris Ovalles MD      CC: No Recipients  ______________________________________________________________________________________    CHIEF COMPLAINT         Hand pain and tingling    ASSESSMENT + PLAN    Bilateral carpal tunnel syndrome, worse on the left    Prior CSI provided 10 months of great relief. At this point she continues to see relief with CSI. She has been able to return to work as a  with conservative treatment. She is still smoking, but working on cessation, which would preclude her from any surgery at the moment. But since the injections continue to provided long term relief she would like to continue with them. L carpal tunnel injection offered and received today in clinic. Discussed the importance of continued smoking cessation. Recommend follow up with Dr Cosby regarding her L triceps numbness as she is s/p spine surgery with him. Follow up prn for repeat injections or to discuss surgery if she quits smoking    HISTORY OF PRESENT ILLNESS       Patient returns today for follow up of her bilateral CTS. Last CSI in February 2023, provided 10 months of relief to L CTS symptoms.  The right side is still essentially asymptomatic.  Has returned to work as . Symptoms worse with repetitive movements. Wearing wrist splints while working, not at night. Also reporting some LUE numbness along triceps with some C7 weakness.    She is not diabetic or hypothyroid.  She does continue to smoke, down to 1/4 pack a day.    PHYSICAL  EXAM    She remains well-developed, well-nourished morbidly obese female in no acute distress.  She appears her stated age and has a pleasant affect.    LUE:   -Pos tinels after one tap  -Motor intact in axillary/AIN/PIN/ulnar distributions  -SILT axillary/radial/median/ulnar distributions  -Hand wwp, 2+ radial pulse, cap refill brisk  -Compartments soft and compressible, no pain with passive stretch of digits  5/5 APB and hand intrinsics with no wasting today.  No pathologic ulnar motor signs.  Negative elbow flexion test.  Positive Durkan but negative Phalen.    RUE:   -Pos tinels after 5 seconds  -Motor intact in axillary/AIN/PIN/ulnar distributions  -SILT axillary/radial/median/ulnar distributions  -Hand wwp, 2+ radial pulse, cap refill brisk  -Compartments soft and compressible, no pain with passive stretch of digits    IMAGING / LABS / EMGs    None today    Kemal Nunez MD  Orthopaedic Surgery PGY-2      PROCEDURE    Hand / UE Inj/Asp: L carpal tunnel for carpal tunnel syndrome on 1/9/2024 5:08 PM  Indications: therapeutic and diagnostic  Details: 25 G needle, volar approach  Medications: 30 mg triamcinolone acetonide 40 mg/mL; 0.75 mL lidocaine 10 mg/mL (1 %)  Outcome: tolerated well, no immediate complications  Procedure, treatment alternatives, risks and benefits explained, specific risks discussed. Consent was given by the patient.           ATTESTATION    I saw and evaluated the patient. I personally obtained the key and critical portions of the history and physical exam or was physically present for key and critical portions performed by the resident/fellow. I reviewed the resident/fellow's documentation and discussed the patient with the resident/fellow. I agree with the resident/fellow's medical decision making as documented in the note.        Electronically signed  DARLENE Ovalles MD  [phone]

## 2024-01-09 NOTE — PROGRESS NOTES
CHIEF COMPLAINT         Hand pain and tingling    ASSESSMENT + PLAN     ***        HISTORY OF PRESENT ILLNESS       Patient is a 53 y.o. ***-hand dominant female ***, who presents today for evaluation of ***      REVIEW OF SYSTEMS       A 30-item multi-system Review Of Systems was obtained on today's intake form.  This was reviewed with the patient and is correct.  The pertinent positives and negatives are listed above.  The form has been scanned separately into the medical record.      PHYSICAL EXAM    Constitutional:    Appears stated age. Well-developed and well-nourished ***.  Psychiatric:         Pleasant normal mood and affect. Behavior is appropriate for the situation.   Head:                   Normocephalic and atraumatic.  Eyes:                    Pupils are equal and round.  Cardiovascular:  2+ radial and ulnar pulses. Fingers well-perfused.  Respiratory:        Effort normal. No respiratory distress. Speaking in complete sentences.  Neurologic:       Alert and oriented to person, place, and time.  Skin:                Skin is intact, warm and dry.  Hematologic / Lymphatic:    No lymphedema or lymphangitis.    Extremities / Musculoskeletal:                      ***      IMAGING / LABS / EMGs           ***      Past Medical History:   Diagnosis Date    Encounter for screening for infections with a predominantly sexual mode of transmission 02/17/2022    Routine screening for STI (sexually transmitted infection)    Personal history of other diseases of the female genital tract     History of vaginal discharge    Personal history of other infectious and parasitic diseases 02/17/2022    History of herpes labialis    Personal history of other specified conditions 06/20/2016    History of abdominal pain       Medication Documentation Review Audit       Reviewed by Clara Wharton MD (Physician) on 01/04/24 at 1434      Medication Order Taking? Sig Documenting Provider Last Dose Status   acetaminophen  (Tylenol) 500 mg tablet [id number] Yes Take by mouth every 6 hours. 1-2 tabs Historical Provider, MD Taking Active   albuterol (ProAir HFA) 90 mcg/actuation inhaler [id number] Yes Inhale 2 puffs every 4 hours if needed for shortness of breath. Clara Wharton MD Taking Active   atorvastatin (Lipitor) 10 mg tablet [id number] Yes Take 1 tablet (10 mg) by mouth once daily at bedtime. Clara Wharton MD Taking Active     Discontinued 01/04/24 1434   benzoyl peroxide 5 % external wash [id number] Yes WASH AFFECTED AREAS ONCE DAILY IN THE MORNING Historical Provider, MD Taking Active   cholecalciferol (Vitamin D-3) 50 mcg (2,000 unit) capsule [id number] Yes Take by mouth. Historical Provider, MD Taking Active   clindamycin (Cleocin T) 1 % lotion [id number] Yes APPLY TOPICALLY TO AFFECTED AREA TWICE A DAY OR UP TO 3-4 TIMES PER DAY AS NEEDED FOR ACTIVE LESIONS Historical Provider, MD Taking Active   cyanocobalamin (Vitamin B-12) 1,000 mcg tablet [id number] Yes Take 1 tablet (1,000 mcg) by mouth once daily. Historical Provider, MD Taking Active   fluticasone (Flonase) 50 mcg/actuation nasal spray [id number] Yes ADMINISTER 1 SPRAY INTO EACH NOSTRIL ONCE DAILY. 1 TO 2 SPRAYS (TS 11-07) Clara Wharton MD Taking Active   fluticasone propion-salmeteroL (Advair Diskus) 250-50 mcg/dose diskus inhaler [id number] Yes Inhale 1 puff every 12 hours. Rinse mouth after each use Clara Wharton MD Taking Active   gabapentin (Neurontin) 300 mg capsule [id number] Yes TAKE 2 CAPSULES (600 MG) BY MOUTH 3 TIMES A DAY. Clara Wharton MD Taking Active   hydroCHLOROthiazide (HYDRODiuril) 25 mg tablet [id number] Yes Take 1 tablet (25 mg) by mouth once daily. Clara Wharton MD Taking Active   meclizine (Antivert) 25 mg tablet [id number] Yes Take by mouth 3 times a day as needed. Historical Provider, MD Taking Active   meloxicam (Mobic) 15 mg tablet [id number] Yes Take 1 tablet (15 mg) by mouth once daily. Clara Nichole  MD Dio Taking Active   multivitamin tablet [id number] Yes Take 1 tablet by mouth once daily. Historical Provider, MD Taking Active   naloxone (Narcan) 4 mg/0.1 mL nasal spray [id number] Yes Administer 1 spray (4 mg) into affected nostril(s) if needed for opioid reversal. May repeat every 2-3 minutes if needed, alternating nostrils, until medical assistance becomes available. Clara Wharton MD Taking Active   rizatriptan (Maxalt) 10 mg tablet [id number] Yes MAY REPEAT IN 2 HOURS IF UNRESOLVED. DO NOT EXCEED 30 MG IN 24 HOURS. Clara Wharton MD Taking Active   topiramate (Topamax) 50 mg tablet [id number] Yes Take 1 tablet (50 mg) by mouth 2 times a day. Clara Wharton MD Taking Active   valACYclovir (Valtrex) 1 gram tablet [id number] Yes Take 1 tablet (1,000 mg) by mouth every 12 hours. Historical Provider, MD Taking Active   venlafaxine XR (Effexor-XR) 37.5 mg 24 hr capsule [id number] Yes ONE EVERY DAY - TAKE ALONG WITH 75MG CAPSULE Clara Wharton MD Taking Active   venlafaxine XR (Effexor-XR) 75 mg 24 hr capsule [id number] Yes Take 1 capsule (75 mg) by mouth once daily. Clara Wharton MD Taking Active                    No Known Allergies    Social History     Socioeconomic History    Marital status: Single     Spouse name: Not on file    Number of children: Not on file    Years of education: Not on file    Highest education level: Not on file   Occupational History    Not on file   Tobacco Use    Smoking status: Every Day     Types: Cigarettes    Smokeless tobacco: Never   Substance and Sexual Activity    Alcohol use: Not Currently    Drug use: Not Currently    Sexual activity: Not on file   Other Topics Concern    Not on file   Social History Narrative    Not on file     Social Determinants of Health     Financial Resource Strain: Not on file   Food Insecurity: Not on file   Transportation Needs: Not on file   Physical Activity: Not on file   Stress: Not on file   Social  Connections: Not on file   Intimate Partner Violence: Not on file   Housing Stability: Not on file       Past Surgical History:   Procedure Laterality Date    OTHER SURGICAL HISTORY  05/03/2016    Laparoscopy With Total Hysterectomy         Electronically Signed      DARLENE Ovalles MD      Orthopaedic Hand Surgery      [phone]

## 2024-01-10 ENCOUNTER — ANCILLARY PROCEDURE (OUTPATIENT)
Dept: RADIOLOGY | Facility: CLINIC | Age: 54
End: 2024-01-10
Payer: COMMERCIAL

## 2024-01-10 ENCOUNTER — TELEPHONE (OUTPATIENT)
Dept: PRIMARY CARE | Facility: CLINIC | Age: 54
End: 2024-01-10

## 2024-01-10 DIAGNOSIS — R92.8 ABNORMALITY OF RIGHT BREAST ON SCREENING MAMMOGRAM: ICD-10-CM

## 2024-01-10 PROCEDURE — 77065 DX MAMMO INCL CAD UNI: CPT | Mod: RIGHT SIDE | Performed by: STUDENT IN AN ORGANIZED HEALTH CARE EDUCATION/TRAINING PROGRAM

## 2024-01-10 PROCEDURE — 76642 ULTRASOUND BREAST LIMITED: CPT | Mod: RIGHT SIDE | Performed by: STUDENT IN AN ORGANIZED HEALTH CARE EDUCATION/TRAINING PROGRAM

## 2024-01-10 PROCEDURE — 77061 BREAST TOMOSYNTHESIS UNI: CPT | Mod: RIGHT SIDE | Performed by: STUDENT IN AN ORGANIZED HEALTH CARE EDUCATION/TRAINING PROGRAM

## 2024-01-10 PROCEDURE — 76642 ULTRASOUND BREAST LIMITED: CPT | Mod: RT

## 2024-01-10 PROCEDURE — 77061 BREAST TOMOSYNTHESIS UNI: CPT | Mod: RT

## 2024-01-10 PROCEDURE — 76982 USE 1ST TARGET LESION: CPT | Mod: RT

## 2024-01-11 DIAGNOSIS — R92.8 ABNORMALITY OF RIGHT BREAST ON SCREENING MAMMOGRAM: Primary | ICD-10-CM

## 2024-01-23 DIAGNOSIS — Z12.11 COLON CANCER SCREENING: Primary | ICD-10-CM

## 2024-01-23 RX ORDER — POLYETHYLENE GLYCOL 3350, SODIUM CHLORIDE, SODIUM BICARBONATE AND POTASSIUM CHLORIDE 420; 5.72; 11.2; 1.48 G/4L; G/4L; G/4L; G/4L
4000 POWDER, FOR SOLUTION ORAL ONCE
Qty: 4000 ML | Refills: 0 | Status: SHIPPED | OUTPATIENT
Start: 2024-01-23 | End: 2024-01-23

## 2024-02-03 RX ORDER — LIDOCAINE HYDROCHLORIDE 10 MG/ML
0.75 INJECTION INFILTRATION; PERINEURAL
Status: COMPLETED | OUTPATIENT
Start: 2024-01-09 | End: 2024-01-09

## 2024-02-03 RX ORDER — TRIAMCINOLONE ACETONIDE 40 MG/ML
30 INJECTION, SUSPENSION INTRA-ARTICULAR; INTRAMUSCULAR
Status: COMPLETED | OUTPATIENT
Start: 2024-01-09 | End: 2024-01-09

## 2024-02-05 ENCOUNTER — OFFICE VISIT (OUTPATIENT)
Dept: PRIMARY CARE | Facility: CLINIC | Age: 54
End: 2024-02-05
Payer: COMMERCIAL

## 2024-02-05 VITALS
HEIGHT: 65 IN | WEIGHT: 231.6 LBS | BODY MASS INDEX: 38.59 KG/M2 | TEMPERATURE: 98 F | DIASTOLIC BLOOD PRESSURE: 75 MMHG | HEART RATE: 83 BPM | SYSTOLIC BLOOD PRESSURE: 126 MMHG

## 2024-02-05 DIAGNOSIS — G43.E09 CHRONIC MIGRAINE WITH AURA WITHOUT STATUS MIGRAINOSUS, NOT INTRACTABLE: Primary | ICD-10-CM

## 2024-02-05 PROCEDURE — 3074F SYST BP LT 130 MM HG: CPT | Performed by: STUDENT IN AN ORGANIZED HEALTH CARE EDUCATION/TRAINING PROGRAM

## 2024-02-05 PROCEDURE — 99214 OFFICE O/P EST MOD 30 MIN: CPT | Performed by: STUDENT IN AN ORGANIZED HEALTH CARE EDUCATION/TRAINING PROGRAM

## 2024-02-05 PROCEDURE — 3078F DIAST BP <80 MM HG: CPT | Performed by: STUDENT IN AN ORGANIZED HEALTH CARE EDUCATION/TRAINING PROGRAM

## 2024-02-05 RX ORDER — TRAMADOL HYDROCHLORIDE 50 MG/1
TABLET ORAL
COMMUNITY
Start: 2024-01-19

## 2024-02-05 RX ORDER — SUMATRIPTAN SUCCINATE 100 MG/1
TABLET ORAL
Qty: 9 TABLET | Refills: 3 | Status: SHIPPED | OUTPATIENT
Start: 2024-02-05

## 2024-02-05 RX ORDER — METHYLPREDNISOLONE 4 MG/1
TABLET ORAL
Qty: 21 TABLET | Refills: 0 | Status: SHIPPED | OUTPATIENT
Start: 2024-02-05 | End: 2024-02-12

## 2024-02-05 RX ORDER — SUMATRIPTAN SUCCINATE 100 MG/1
TABLET ORAL
COMMUNITY
Start: 2024-01-22 | End: 2024-02-05 | Stop reason: SDUPTHER

## 2024-02-05 NOTE — PATIENT INSTRUCTIONS
We are on a new system that is paperless.     Lab location for blood work :  1. Located across the office , just past the elevators .   [address]. [address].  If you are coming on a Saturday, go to [address] for the blood draw    Radiology : [address] for Xrays  You can also schedule non urgent imaging by calling .     For scheduling appts, call . You might be receiving call from central scheduling as well.    For scheduling colonoscopy, call .     For scheduling physical therapy, call [phone] ( [phone]).    For any other scheduling questions or locations, please ask the medical assistant or the .

## 2024-02-05 NOTE — LETTER
February 5, 2024     Patient: Iwona Downey   YOB: 1970   Date of Visit: 2/5/2024       To Whom It May Concern:    Iwona Downey was seen in my clinic on 2/5/2024 at 9:30 am. Please excuse Iwona for her absence from work on this day to make the appointment.    If you have any questions or concerns, please don't hesitate to call.         Sincerely,         MD Dr. Dio Nickerson Lily Grace   [facility]   [place] [address]  Phone : [phone]  Fax:      [phone]         CC: No Recipients

## 2024-02-05 NOTE — PROGRESS NOTES
"Subjective   Patient ID: Iwona Downey is a 53 y.o. female who presents for Follow-up (Migraine  01/20/2024).        HPI    52 y/o female with asthma, current every day smoker, HTN, migraine HA ,  h/o  intractable cervical radiculopathy secondary to cervical stenosis s/p C5-7 ACDF ( April 2022), mild DJD of SI joints , DJD of knee joints ( managed at [facility] , no recent Xrays to review ) and hips        Experiencing HA everyday X 3 weeks   Reduced smoking , 4-5 cigarettes   Reports adequate hydration   Poor sleep due to anxiety   Multiple social stressors at home , she is on effexor and  has an upcoming appt with psychologist   Went to  in Jan , was given sumatriptan 100mg , helps but sleepy with med   She was on Rizatriptan prior to the above but needing to use more   She  takes topiramate regularly   Photosensitivity and photopsia with HA     Also has knee pain   Issues with \" cars and family \"         Visit Vitals  /75 (BP Location: Right arm, Patient Position: Sitting, BP Cuff Size: Large adult)   Pulse 83   Temp 36.7 °C (98 °F)   Ht 1.651 m (5' 5\")   Wt 105 kg (231 lb 9.6 oz)   BMI 38.54 kg/m²   OB Status Hysterectomy   Smoking Status Every Day   BSA 2.19 m²      No LMP recorded. Patient has had a hysterectomy.     Review of Systems    Constitutional : No feeling poorly / fevers/ chills / night sweats/ fatigue   Cardiovascular : No CP /Palpitations/ lower extremity edema / syncope   Respiratory : No Cough /MONTIEL/Dyspnea at rest   Gastrointestinal : No abd pain / N/V  No bloody stools/ melena / constipation  Endo : No polyuria/polydipsia/ muscle weakness / sluggishness   CNS: No confusion / HA/ tingling/ numbness/ weakness of extremities  Psychiatric: No anxiety/ depression/ SI/HI    All other systems have been reviewed and are negative for complaint       Physical Exam    Constitutional : Vitals reviewed. Alert and in no distress  Cardiovascular : RRR, Normal S1, S2, No pericardial rub/ gallop, no peripheral " edema   Pulmonary: No respiratory distress, CTAB   MSK : Normal gait and station , strength and tone     Neurologic : CNs 2-12 grossly intact , no obvious FNDs  Psych : A,Ox3, normal mood and affect      Assessment/Plan   Diagnoses and all orders for this visit:  Chronic migraine with aura without status migrainosus, not intractable  -     Referral to Neurology; Future  -     methylPREDNISolone (Medrol, Deandre,) 4 mg tablets; Follow schedule on package instructions  -     SUMAtriptan (Imitrex) 100 mg tablet; Take 1 tablet at the first sign of headache , take another one in 2 hrs if no improvement in headache . Maximum 2 tablets per day      54 y/o female with asthma, current every day smoker, HTN, migraine HA ,  h/o  intractable cervical radiculopathy secondary to cervical stenosis s/p C5-7 ACDF ( April 2022), mild DJD of SI joints , DJD of knee joints ( managed at f , no recent Xrays to review ) and hips         Multiple stressors   Rx with medrol dose pack   Referred to Neurology for further eval of HA  Increase Topiramate to 100mg BID , she is currently on 50mg BID     Conditions addressed and mgmt as noted above.  Pertinent labs, images/ imaging reports , chart review was done .       RTO in 3m

## 2024-02-25 DIAGNOSIS — M79.7 FIBROMYALGIA: ICD-10-CM

## 2024-02-26 ENCOUNTER — APPOINTMENT (OUTPATIENT)
Dept: PAIN MEDICINE | Facility: HOSPITAL | Age: 54
End: 2024-02-26
Payer: COMMERCIAL

## 2024-02-26 RX ORDER — GABAPENTIN 300 MG/1
600 CAPSULE ORAL 3 TIMES DAILY
Qty: 180 CAPSULE | Refills: 1 | Status: SHIPPED | OUTPATIENT
Start: 2024-02-26

## 2024-03-05 ENCOUNTER — APPOINTMENT (OUTPATIENT)
Dept: INTEGRATIVE MEDICINE | Facility: CLINIC | Age: 54
End: 2024-03-05
Payer: COMMERCIAL

## 2024-03-12 ENCOUNTER — OFFICE VISIT (OUTPATIENT)
Dept: ORTHOPEDIC SURGERY | Facility: CLINIC | Age: 54
End: 2024-03-12
Payer: COMMERCIAL

## 2024-03-12 DIAGNOSIS — M79.642 PAIN OF LEFT HAND: Primary | ICD-10-CM

## 2024-03-12 PROCEDURE — 99213 OFFICE O/P EST LOW 20 MIN: CPT | Performed by: ORTHOPAEDIC SURGERY

## 2024-03-12 NOTE — PROGRESS NOTES
CHIEF COMPLAINT         Bilateral CTS    ASSESSMENT + PLAN    Left radial hand volar and dorsal tingling    I reviewed that the lack of any response to a cortisone injection of the carpal tunnel points away from that as the likely symptom generator.  Further, carpal tunnel nerve compression should not produce any symptoms on the dorsum of the hand.  I suspect this may be coming from your neck, and I would like you to go back and see Dr. Cosby to get his thoughts on this.    Follow-up with me with any additional concerns.        HISTORY OF PRESENT ILLNESS       Patient returns today, about 2 months after last visit with me for numbness and tingling in the radial digits of both hands but much more pronounced on the left.  She had a cortisone injection from me on January 9.  She reports that this made no difference whatsoever.  Symptoms are volar and dorsal, primarily thumb and index on the left and more mildly in the long.  This does radiate up laterally on the arm as high as the neck.  No noted weakness in the hand though she does describe some weakness of pushing with the triceps on that side.  Symptoms occur primarily during the day and only rarely wakes her from sleep, with a positive shake sign.  The right hand does not wake her from sleep.      PHYSICAL EXAM       She remains well-developed, well-nourished female in no acute distress.  She appears her stated age and has a pleasant affect.  Skin of the left hand and wrist remains intact with no erythema, ecchymosis, or diffuse swelling.  Normal skin drag and coloration.  Intact thenar motor function with no significant wasting.  Negative Phalen and Durkan today.  Equivocal Tinel at the carpal tunnel and negative at the cubital tunnel.  Negative elbow flexion test.  Cervical range of motion is limited in both rotation and lateral bend.  It causes some discomfort in the neck, but not clearly down the hand today.    IMAGING / LABS / EMGs    None  today      Electronically Signed      DARLENE Ovalles MD      Orthopaedic Hand Surgery      [phone]

## 2024-03-12 NOTE — LETTER
April 13, 2024     Clara Wharton MD  [address]   [address]  [facility] [address]    Patient: Iwona Downey   YOB: 1970   Date of Visit: 3/12/2024       Dear Dr. Clara Wharton MD:    Thank you for referring Iwona Downey to me for evaluation. Below are my notes for this consultation.  If you have questions, please do not hesitate to call me. I look forward to following your patient along with you.       Sincerely,     Crhis Ovalles MD      CC: No Recipients  ______________________________________________________________________________________    CHIEF COMPLAINT         Bilateral CTS    ASSESSMENT + PLAN    Left radial hand volar and dorsal tingling    I reviewed that the lack of any response to a cortisone injection of the carpal tunnel points away from that as the likely symptom generator.  Further, carpal tunnel nerve compression should not produce any symptoms on the dorsum of the hand.  I suspect this may be coming from your neck, and I would like you to go back and see Dr. Cosby to get his thoughts on this.    Follow-up with me with any additional concerns.        HISTORY OF PRESENT ILLNESS       Patient returns today, about 2 months after last visit with me for numbness and tingling in the radial digits of both hands but much more pronounced on the left.  She had a cortisone injection from me on January 9.  She reports that this made no difference whatsoever.  Symptoms are volar and dorsal, primarily thumb and index on the left and more mildly in the long.  This does radiate up laterally on the arm as high as the neck.  No noted weakness in the hand though she does describe some weakness of pushing with the triceps on that side.  Symptoms occur primarily during the day and only rarely wakes her from sleep, with a positive shake sign.  The right hand does not wake her from sleep.      PHYSICAL EXAM       She remains well-developed, well-nourished female in no acute  distress.  She appears her stated age and has a pleasant affect.  Skin of the left hand and wrist remains intact with no erythema, ecchymosis, or diffuse swelling.  Normal skin drag and coloration.  Intact thenar motor function with no significant wasting.  Negative Phalen and Durkan today.  Equivocal Tinel at the carpal tunnel and negative at the cubital tunnel.  Negative elbow flexion test.  Cervical range of motion is limited in both rotation and lateral bend.  It causes some discomfort in the neck, but not clearly down the hand today.    IMAGING / LABS / EMGs    None today      Electronically Signed      DARLENE Ovalles MD      Orthopaedic Hand Surgery      [phone]

## 2024-03-18 ENCOUNTER — OFFICE VISIT (OUTPATIENT)
Dept: PAIN MEDICINE | Facility: HOSPITAL | Age: 54
End: 2024-03-18
Payer: COMMERCIAL

## 2024-03-18 ENCOUNTER — TRANSCRIBE ORDERS (OUTPATIENT)
Dept: ORTHOPEDIC SURGERY | Facility: HOSPITAL | Age: 54
End: 2024-03-18
Payer: COMMERCIAL

## 2024-03-18 DIAGNOSIS — M79.7 FIBROMYALGIA: Primary | ICD-10-CM

## 2024-03-18 DIAGNOSIS — M47.812 OSTEOARTHRITIS OF CERVICAL SPINE, UNSPECIFIED SPINAL OSTEOARTHRITIS COMPLICATION STATUS: ICD-10-CM

## 2024-03-18 DIAGNOSIS — M48.02 CERVICAL SPINAL STENOSIS: ICD-10-CM

## 2024-03-18 PROCEDURE — 99213 OFFICE O/P EST LOW 20 MIN: CPT | Performed by: ANESTHESIOLOGY

## 2024-03-18 PROCEDURE — 99203 OFFICE O/P NEW LOW 30 MIN: CPT | Performed by: ANESTHESIOLOGY

## 2024-03-18 ASSESSMENT — PAIN SCALES - GENERAL: PAINLEVEL: 8

## 2024-03-18 NOTE — PROGRESS NOTES
Patient ID: Iwona Downey is a 53 y.o. female who presents for initial evaluation of bilateral knee pain s/p bilateral meniscal repair surgeries in 2022 (done in private surgery center with Dr. Izaguirre). Patient reports pain slightly improved in right knee however left is about the same. She is favoring the right knee now, wearing brace in left and having significant on-going pain in both refractory to physical therapy, cortisone injections (did not help at all) and surgical intervention; had somewhat better response to gel shots. She's never had injections with our team and says she saw chronic pain years ago but was only referred to physical therapy.     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     For pain management, she's currently on Gabapentin, Tramadol, Tizanidine,     She's not on any blood thinners.     Ms. Downey has been dealing with multiple other sources of pain, including cervical neck pain (is s/p interbody fusion C5-C7)--for which she is seeing Dr. Cosby tomorrow she says.       Imaging:  === 01/12/22 ===    MR CERVICAL SPINE WO CONTRAST    - Impression -  C4/C5: Moderate left foraminal stenosis.    C5/C6: Moderate central canal stenosis. Moderate left foraminal  stenosis.    C6/C7: Moderate left foraminal stenosis.    C7/T1: Moderate right and moderate left foraminal stenosis.    === 08/31/22 ===    CHEST 2 VIEW    - Impression -  No acute cardiopulmonary process noted.      Lab Results   Component Value Date    HGBA1C 5.4 01/09/2024       Current Outpatient Medications   Medication Instructions    acetaminophen (Tylenol) 500 mg tablet oral, Every 6 hours, 1-2 tabs    albuterol (ProAir HFA) 90 mcg/actuation inhaler 2 puffs, inhalation, Every 4 hours PRN    atorvastatin (LIPITOR) 10 mg, oral, Nightly    benzoyl peroxide 5 % external wash WASH AFFECTED AREAS ONCE DAILY IN THE MORNING    cholecalciferol (Vitamin D-3) 50 mcg (2,000  unit) capsule oral    clindamycin (Cleocin T) 1 % lotion APPLY TOPICALLY TO AFFECTED AREA TWICE A DAY OR UP TO 3-4 TIMES PER DAY AS NEEDED FOR ACTIVE LESIONS    cyanocobalamin (Vitamin B-12) 1,000 mcg tablet 1 tablet, oral, Daily    fluticasone (Flonase) 50 mcg/actuation nasal spray ADMINISTER 1 SPRAY INTO EACH NOSTRIL ONCE DAILY. 1 TO 2 SPRAYS (TS 11-07)    fluticasone propion-salmeteroL (Advair Diskus) 250-50 mcg/dose diskus inhaler 1 puff, inhalation, Every 12 hours, Rinse mouth after each use    gabapentin (NEURONTIN) 600 mg, oral, 3 times daily    hydroCHLOROthiazide (HYDRODIURIL) 25 mg, oral, Daily    meclizine (Antivert) 25 mg tablet oral, 3 times daily PRN    meloxicam (MOBIC) 15 mg, oral, Daily    multivitamin tablet 1 tablet, oral, Daily    naloxone (NARCAN) 4 mg, nasal, As needed, May repeat every 2-3 minutes if needed, alternating nostrils, until medical assistance becomes available.    rizatriptan (Maxalt) 10 mg tablet MAY REPEAT IN 2 HOURS IF UNRESOLVED. DO NOT EXCEED 30 MG IN 24 HOURS.    SUMAtriptan (Imitrex) 100 mg tablet Take 1 tablet at the first sign of headache , take another one in 2 hrs if no improvement in headache . Maximum 2 tablets per day    topiramate (TOPAMAX) 50 mg, oral, 2 times daily    traMADol (Ultram) 50 mg tablet TAKE 1 TABLET (50 MG) BY MOUTH EVERY 6 HOURS IF NEEDED FOR SEVERE PAIN (7 - 10) FOR UP TO 7 DAYS.    valACYclovir (VALTREX) 1,000 mg, oral, Every 12 hours    venlafaxine XR (Effexor-XR) 37.5 mg 24 hr capsule ONE EVERY DAY - TAKE ALONG WITH 75MG CAPSULE    venlafaxine XR (EFFEXOR-XR) 75 mg, oral, Daily        Past Medical History:   Diagnosis Date    Encounter for screening for infections with a predominantly sexual mode of transmission 02/17/2022    Routine screening for STI (sexually transmitted infection)    Personal history of other diseases of the female genital tract     History of vaginal discharge    Personal history of other infectious and parasitic diseases  02/17/2022    History of herpes labialis    Personal history of other specified conditions 06/20/2016    History of abdominal pain        Past Surgical History:   Procedure Laterality Date    OTHER SURGICAL HISTORY  05/03/2016    Laparoscopy With Total Hysterectomy        Family History   Problem Relation Name Age of Onset    No Known Problems Mother      No Known Problems Father      Breast cancer Maternal Grandmother  30    Breast cancer Mother's Sister  34        No Known Allergies     Objective     There were no vitals filed for this visit.     Physical Exam    GENERAL EXAM  Vital Signs: Vital signs to include heart rate, respiration rate, blood pressure, and temperature were reviewed.  General Appearance:  Awake, alert, healthy appearing, well developed, No acute distress.  Head: Normocephalic without evidence of head injury.  Neck: The appearance of the neck was normal without swelling with a midline trachea.  Eyes: The eyelids and eyebrows exhibited no abnormalities.  Pupils were not pin-point.  Sclera was without icterus.  Lungs: Respiration rhythm and depth was normal.  Respiratory movements were normal without labored breathing.  Cardiovascular: No peripheral edema was present.    Neurological: Patient was oriented to time, place, and person.  Speech was normal.  Balance, gait, and stance were unremarkable.    Psychiatric: Appearance was normal with appropriate dress.  Mood was euthymic and affect was normal.  Skin: Affected regions were without ecchymosis or skin lesions.    Musculoskeletal Exam:  5/5 muscle strength of bilateral hip flexors, knee extensors, ankle dorsiflexors and plantarflexors, and EHL  Light touch sensation intact to bilateral upper and lower extremities  No evidence of swelling at this time on exam  Left knee is in brace; pain to passive ROM of left knee   There is no tenderness to palpation of knees, no skin color changes  +trigger point tenderness bilateral chest and forehead          Assessment/Plan   Diagnoses and all orders for this visit:  Osteoarthritis of cervical spine, unspecified spinal osteoarthritis complication status  -     Referral to Pain Medicine  Fibromyalgia     Iwona Downey is a 53 y.o. female who presents for initial evaluation of bilateral knee pain s/p bilateral meniscal repair surgeries in 2022. Patient reports pain slightly improved in right knee however left is about the same. Exam today and pain in multiple joints consistent with potential diagnosis of Fibromyalgia. Given that her pains have been refractory to medical and surgical management, patient may benefit from aerobic low intensity exercise for Fibromyalgia pain. Diagnosis and treatment were discussed with patient at length and patient instructed to see us again if symptoms do not improve with physical therapy.     Plan:  - The patient was explained that the mainstay of managing fibromyalgia/myofascial pain is to participate in low-impact aerobic exercising for 1 hour day, 6 days a week. Examples of low impact aerobic exercising include swimming, riding a stationary bicycle or exercising on an elliptical machine. Low-impact aerobic exercising at that rate results in much better pain relief than any of the medications that could be prescribed for fibromyalgia/myofascial pain and without side effects. The patient must be compliant, however, and must participate in such therapy for 8 weeks consecutively before noticing a remarkable response. It was explained to the patient that one could titrate up from a smaller duration of exercising by increasing the time spent doing the low-impact aerobic exercise in small increments such as 1 minute every day until meeting the 60 minutes a day, 6 days a week goal. The patient was receptive to the counseling and agrees to continue to exercise toward this goal.   - Follow-up as needed    Melinda Morin MD  Anesthesiology     Patient seen and examined with attending,   Stacy who agrees with assessment and plan.

## 2024-03-19 ENCOUNTER — HOSPITAL ENCOUNTER (OUTPATIENT)
Dept: RADIOLOGY | Facility: CLINIC | Age: 54
Discharge: HOME | End: 2024-03-19
Payer: COMMERCIAL

## 2024-03-19 ENCOUNTER — OFFICE VISIT (OUTPATIENT)
Dept: ORTHOPEDIC SURGERY | Facility: CLINIC | Age: 54
End: 2024-03-19
Payer: COMMERCIAL

## 2024-03-19 DIAGNOSIS — M54.12 CERVICAL RADICULOPATHY: Primary | ICD-10-CM

## 2024-03-19 DIAGNOSIS — M54.2 CERVICAL PAIN: ICD-10-CM

## 2024-03-19 DIAGNOSIS — M48.02 CERVICAL SPINAL STENOSIS: ICD-10-CM

## 2024-03-19 PROCEDURE — 72050 X-RAY EXAM NECK SPINE 4/5VWS: CPT | Performed by: RADIOLOGY

## 2024-03-19 PROCEDURE — 99213 OFFICE O/P EST LOW 20 MIN: CPT

## 2024-03-19 PROCEDURE — 72050 X-RAY EXAM NECK SPINE 4/5VWS: CPT

## 2024-03-19 ASSESSMENT — PAIN - FUNCTIONAL ASSESSMENT: PAIN_FUNCTIONAL_ASSESSMENT: 0-10

## 2024-03-19 ASSESSMENT — PAIN DESCRIPTION - DESCRIPTORS: DESCRIPTORS: ACHING;SHARP;SHOOTING

## 2024-03-19 ASSESSMENT — PAIN SCALES - GENERAL: PAINLEVEL_OUTOF10: 9

## 2024-03-19 NOTE — PROGRESS NOTES
HPI:  Iwona Downey is a pleasant 53-year-old female who presents today with a 1 month history of left arm tingling.  She states this started randomly and is constant.  The tingling is from the base of the neck, into the shoulder, into the arm, and all 5 fingers.  She denies pain and reports only tingling.  Nothing makes it better or worse.  She also states her fingers are cramping.  She denies dropping things.  Denies any new balance and coordination deficits.  She has a history of C5-C7 ACDF with Dr. Cosby.      ROS:  Reviewed on EMR and patient intake sheet.    PMH/SH:  Reviewed on EMR and patient intake sheet.    Exam:  MSK: Full strength range of motion of upper extremities bilaterally.  Negative Mansi's, negative Spurling's.  General: No acute distress. Awake and conversant.  Eyes: Normal conjunctiva, anicteric. Round symmetric pupils.  ENT: Hearing grossly intact. No nasal discharge.  Neck: Neck is supple. No masses or thyromegaly.  Respiratory: Respirations are non-labored. No wheezing.  Skin: Warm. No rashes or ulcers.  Psych: Alert and oriented. Cooperative, appropriate mood and affect, normal judgement.  CV: No lower extremity edema.  Neuro: Sensation and CN II-XII grossly normal.    Radiology:     X-rays personally reviewed and demonstrate mild disc degeneration at C4/5.  Grade 1 retrolisthesis of C4 on C5.  Previous cervical fusion appears intact.    Diagnosis:    Cervical radiculopathy    Assessment and Plan:  Patient is seen today and evaluated for left arm tingling that has been present for the past month.  At this point, I recommended conservative management of physical therapy with traction.  We discussed a prednisone taper, as well as gabapentin, and the patient deferred wanting to take prednisone, and she is also already on gabapentin.  I discussed with the patient that she will follow-up after completion of her therapy if her symptoms have not improved or worsened.  At that point, we will  obtain an MRI of her cervical spine.  Patient feels all questions were appropriately answered at time of visit today.  Patient agrees to the plan above.    This note was dictated using speech recognition software and was not corrected for spelling or grammatical errors    Ryan Ramirez PA-C  Department of Orthopaedic Surgery  2:32 PM  03/19/24      [id number] [place] [address]    Voicemail: [phone]   Appts: [phone]  Fax: [phone]

## 2024-03-22 DIAGNOSIS — M47.22 OSTEOARTHRITIS OF SPINE WITH RADICULOPATHY, CERVICAL REGION: ICD-10-CM

## 2024-03-22 DIAGNOSIS — M17.0 OSTEOARTHRITIS OF BOTH KNEES, UNSPECIFIED OSTEOARTHRITIS TYPE: ICD-10-CM

## 2024-03-22 DIAGNOSIS — Z98.1 S/P CERVICAL SPINAL FUSION: ICD-10-CM

## 2024-03-22 DIAGNOSIS — M46.1 DEGENERATIVE JOINT DISEASE OF SACROILIAC JOINT (CMS-HCC): ICD-10-CM

## 2024-03-27 RX ORDER — MELOXICAM 15 MG/1
15 TABLET ORAL DAILY
Qty: 90 TABLET | Refills: 0 | Status: SHIPPED | OUTPATIENT
Start: 2024-03-27

## 2024-04-04 DIAGNOSIS — I10 PRIMARY HYPERTENSION: ICD-10-CM

## 2024-04-05 RX ORDER — HYDROCHLOROTHIAZIDE 25 MG/1
25 TABLET ORAL DAILY
Qty: 90 TABLET | Refills: 0 | Status: SHIPPED | OUTPATIENT
Start: 2024-04-05

## 2024-05-09 RX ORDER — SODIUM CHLORIDE, SODIUM LACTATE, POTASSIUM CHLORIDE, CALCIUM CHLORIDE 600; 310; 30; 20 MG/100ML; MG/100ML; MG/100ML; MG/100ML
20 INJECTION, SOLUTION INTRAVENOUS CONTINUOUS
Status: CANCELLED | OUTPATIENT
Start: 2024-05-09

## 2024-05-09 NOTE — H&P
History Of Present Illness  Iwona Downey is a 53 y.o. female presenting with colon cancer screening.     Past Medical History  Past Medical History:   Diagnosis Date    Encounter for screening for infections with a predominantly sexual mode of transmission 02/17/2022    Routine screening for STI (sexually transmitted infection)    Personal history of other diseases of the female genital tract     History of vaginal discharge    Personal history of other infectious and parasitic diseases 02/17/2022    History of herpes labialis    Personal history of other specified conditions 06/20/2016    History of abdominal pain     Surgical History  Past Surgical History:   Procedure Laterality Date    OTHER SURGICAL HISTORY  05/03/2016    Laparoscopy With Total Hysterectomy     Social History  She reports that she has been smoking cigarettes. She has never used smokeless tobacco. She reports that she does not currently use alcohol. She reports that she does not currently use drugs.    Family History  Family History   Problem Relation Name Age of Onset    No Known Problems Mother      No Known Problems Father      Breast cancer Maternal Grandmother  30    Breast cancer Mother's Sister  34        Allergies  No Known Allergies  Review of Systems     Physical Exam     Last Recorded Vitals  There were no vitals taken for this visit.    Assessment/Plan   Colon cancer screening    Proceed with colonoscopy     Holger Issa MD

## 2024-05-10 ENCOUNTER — ANESTHESIA (OUTPATIENT)
Dept: GASTROENTEROLOGY | Facility: HOSPITAL | Age: 54
End: 2024-05-10
Payer: COMMERCIAL

## 2024-05-10 ENCOUNTER — HOSPITAL ENCOUNTER (OUTPATIENT)
Dept: GASTROENTEROLOGY | Facility: HOSPITAL | Age: 54
Setting detail: OUTPATIENT SURGERY
Discharge: HOME | End: 2024-05-10
Payer: COMMERCIAL

## 2024-05-10 ENCOUNTER — ANESTHESIA EVENT (OUTPATIENT)
Dept: GASTROENTEROLOGY | Facility: HOSPITAL | Age: 54
End: 2024-05-10
Payer: COMMERCIAL

## 2024-05-10 VITALS
DIASTOLIC BLOOD PRESSURE: 92 MMHG | BODY MASS INDEX: 38.16 KG/M2 | HEIGHT: 65 IN | RESPIRATION RATE: 18 BRPM | TEMPERATURE: 96.8 F | SYSTOLIC BLOOD PRESSURE: 149 MMHG | WEIGHT: 229.06 LBS | OXYGEN SATURATION: 98 % | HEART RATE: 62 BPM

## 2024-05-10 DIAGNOSIS — Z12.11 SCREENING FOR COLON CANCER: ICD-10-CM

## 2024-05-10 PROCEDURE — 7100000010 HC PHASE TWO TIME - EACH INCREMENTAL 1 MINUTE

## 2024-05-10 PROCEDURE — 2500000004 HC RX 250 GENERAL PHARMACY W/ HCPCS (ALT 636 FOR OP/ED): Performed by: ANESTHESIOLOGIST ASSISTANT

## 2024-05-10 PROCEDURE — 88305 TISSUE EXAM BY PATHOLOGIST: CPT | Mod: TC,AHULAB | Performed by: INTERNAL MEDICINE

## 2024-05-10 PROCEDURE — 88305 TISSUE EXAM BY PATHOLOGIST: CPT | Performed by: PATHOLOGY

## 2024-05-10 PROCEDURE — 45380 COLONOSCOPY AND BIOPSY: CPT | Performed by: INTERNAL MEDICINE

## 2024-05-10 PROCEDURE — A45380 PR COLONOSCOPY,BIOPSY: Performed by: ANESTHESIOLOGY

## 2024-05-10 PROCEDURE — 45385 COLONOSCOPY W/LESION REMOVAL: CPT | Performed by: INTERNAL MEDICINE

## 2024-05-10 PROCEDURE — 3700000001 HC GENERAL ANESTHESIA TIME - INITIAL BASE CHARGE

## 2024-05-10 PROCEDURE — A45380 PR COLONOSCOPY,BIOPSY: Performed by: ANESTHESIOLOGIST ASSISTANT

## 2024-05-10 PROCEDURE — 7100000009 HC PHASE TWO TIME - INITIAL BASE CHARGE

## 2024-05-10 PROCEDURE — 3700000002 HC GENERAL ANESTHESIA TIME - EACH INCREMENTAL 1 MINUTE

## 2024-05-10 RX ORDER — MIDAZOLAM HYDROCHLORIDE 1 MG/ML
INJECTION INTRAMUSCULAR; INTRAVENOUS AS NEEDED
Status: DISCONTINUED | OUTPATIENT
Start: 2024-05-10 | End: 2024-05-10

## 2024-05-10 RX ORDER — PROPOFOL 10 MG/ML
INJECTION, EMULSION INTRAVENOUS CONTINUOUS PRN
Status: DISCONTINUED | OUTPATIENT
Start: 2024-05-10 | End: 2024-05-10

## 2024-05-10 RX ORDER — SODIUM CHLORIDE, SODIUM LACTATE, POTASSIUM CHLORIDE, CALCIUM CHLORIDE 600; 310; 30; 20 MG/100ML; MG/100ML; MG/100ML; MG/100ML
INJECTION, SOLUTION INTRAVENOUS CONTINUOUS PRN
Status: DISCONTINUED | OUTPATIENT
Start: 2024-05-10 | End: 2024-05-10

## 2024-05-10 RX ORDER — FENTANYL CITRATE 50 UG/ML
INJECTION, SOLUTION INTRAMUSCULAR; INTRAVENOUS AS NEEDED
Status: DISCONTINUED | OUTPATIENT
Start: 2024-05-10 | End: 2024-05-10

## 2024-05-10 RX ADMIN — SODIUM CHLORIDE, POTASSIUM CHLORIDE, SODIUM LACTATE AND CALCIUM CHLORIDE: 600; 310; 30; 20 INJECTION, SOLUTION INTRAVENOUS at 14:07

## 2024-05-10 RX ADMIN — FENTANYL CITRATE 50 MCG: 50 INJECTION, SOLUTION INTRAMUSCULAR; INTRAVENOUS at 13:29

## 2024-05-10 RX ADMIN — MIDAZOLAM HYDROCHLORIDE 2 MG: 1 INJECTION INTRAMUSCULAR; INTRAVENOUS at 13:18

## 2024-05-10 RX ADMIN — PROPOFOL 200 MCG/KG/MIN: 10 INJECTION, EMULSION INTRAVENOUS at 13:21

## 2024-05-10 RX ADMIN — FENTANYL CITRATE 50 MCG: 50 INJECTION, SOLUTION INTRAMUSCULAR; INTRAVENOUS at 13:43

## 2024-05-10 ASSESSMENT — COLUMBIA-SUICIDE SEVERITY RATING SCALE - C-SSRS
2. HAVE YOU ACTUALLY HAD ANY THOUGHTS OF KILLING YOURSELF?: NO
1. IN THE PAST MONTH, HAVE YOU WISHED YOU WERE DEAD OR WISHED YOU COULD GO TO SLEEP AND NOT WAKE UP?: NO
6. HAVE YOU EVER DONE ANYTHING, STARTED TO DO ANYTHING, OR PREPARED TO DO ANYTHING TO END YOUR LIFE?: NO

## 2024-05-10 ASSESSMENT — PAIN - FUNCTIONAL ASSESSMENT
PAIN_FUNCTIONAL_ASSESSMENT: 0-10
PAIN_FUNCTIONAL_ASSESSMENT: 0-10

## 2024-05-10 ASSESSMENT — PAIN SCALES - GENERAL
PAINLEVEL_OUTOF10: 0 - NO PAIN

## 2024-05-10 NOTE — DISCHARGE INSTRUCTIONS

## 2024-05-10 NOTE — ANESTHESIA PREPROCEDURE EVALUATION
Patient: Iwona Downey    Procedure Information       Date/Time: 05/10/24 1400    Scheduled providers: Holger Issa MD; Ronaldo Alexander MD; JOVANNI Jacobs    Procedure: COLONOSCOPY    Location: [facility]            Relevant Problems   Anesthesia   (+) Left corneal abrasion      Cardiac   (+) Hypertension      Pulmonary   (+) Asthmatic bronchitis (HHS-HCC)   (+) Obstructive sleep apnea      Neuro   (+) Bilateral sciatica   (+) Carpal tunnel syndrome of left wrist   (+) Depression with anxiety   (+) Left cervical radiculopathy   (+) Right carpal tunnel syndrome   (+) Sciatica of left side      Endocrine   (+) Goiter   (+) Morbid obesity (Multi)   (+) Obesity      Musculoskeletal   (+) Carpal tunnel syndrome of left wrist   (+) Cervical spinal stenosis   (+) Fibromyalgia   (+) Lumbar degenerative disc disease   (+) Right carpal tunnel syndrome      HEENT   (+) Other acute sinusitis   (+) Sinobronchitis      ID   (+) Cold sore   (+) Dermatitis, eyelid, herpes zoster   (+) Plantar wart of right foot   (+) Upper respiratory infection      Skin   (+) Eczema   (+) Skin rash      GYN   (+) Abnormal uterine bleeding (AUB)   (+) Fibroid uterus       Clinical information reviewed:   Tobacco  Allergies  Meds   Med Hx  Surg Hx   Fam Hx  Soc Hx         Past Medical History:   Diagnosis Date    Asthma (HHS-HCC)     Fibromyalgia     HTN (hypertension)     Migraines     VEGA (obstructive sleep apnea)       Past Surgical History:   Procedure Laterality Date    ANTERIOR CERVICAL DISCECTOMY W/ FUSION  04/11/2022    C5-7    COLONOSCOPY      KNEE ARTHROSCOPY W/ DEBRIDEMENT Bilateral     LAPAROSCOPIC HYSTERECTOMY       Social History     Tobacco Use    Smoking status: Every Day     Current packs/day: 0.25     Types: Cigarettes    Smokeless tobacco: Never   Vaping Use    Vaping status: Never Used   Substance Use Topics    Alcohol use: Not Currently    Drug use: Not Currently      Current Outpatient Medications    Medication Instructions    acetaminophen (Tylenol) 500 mg tablet oral, Every 6 hours, 1-2 tabs    albuterol (ProAir HFA) 90 mcg/actuation inhaler 2 puffs, inhalation, Every 4 hours PRN    atorvastatin (LIPITOR) 10 mg, oral, Nightly    benzoyl peroxide 5 % external wash WASH AFFECTED AREAS ONCE DAILY IN THE MORNING    cholecalciferol (Vitamin D-3) 50 mcg (2,000 unit) capsule oral    clindamycin (Cleocin T) 1 % lotion APPLY TOPICALLY TO AFFECTED AREA TWICE A DAY OR UP TO 3-4 TIMES PER DAY AS NEEDED FOR ACTIVE LESIONS    cyanocobalamin (Vitamin B-12) 1,000 mcg tablet 1 tablet, oral, Daily    fluticasone (Flonase) 50 mcg/actuation nasal spray ADMINISTER 1 SPRAY INTO EACH NOSTRIL ONCE DAILY. 1 TO 2 SPRAYS (TS 11-07)    fluticasone propion-salmeteroL (Advair Diskus) 250-50 mcg/dose diskus inhaler 1 puff, inhalation, Every 12 hours, Rinse mouth after each use    gabapentin (NEURONTIN) 600 mg, oral, 3 times daily    hydroCHLOROthiazide (HYDRODIURIL) 25 mg, oral, Daily    meclizine (Antivert) 25 mg tablet oral, 3 times daily PRN    meloxicam (MOBIC) 15 mg, oral, Daily    multivitamin tablet 1 tablet, oral, Daily    naloxone (NARCAN) 4 mg, nasal, As needed, May repeat every 2-3 minutes if needed, alternating nostrils, until medical assistance becomes available.    SUMAtriptan (Imitrex) 100 mg tablet Take 1 tablet at the first sign of headache , take another one in 2 hrs if no improvement in headache . Maximum 2 tablets per day    topiramate (TOPAMAX) 50 mg, oral, 2 times daily    traMADol (Ultram) 50 mg tablet TAKE 1 TABLET (50 MG) BY MOUTH EVERY 6 HOURS IF NEEDED FOR SEVERE PAIN (7 - 10) FOR UP TO 7 DAYS.    valACYclovir (VALTREX) 1,000 mg, oral, Every 12 hours    venlafaxine XR (Effexor-XR) 37.5 mg 24 hr capsule ONE EVERY DAY - TAKE ALONG WITH 75MG CAPSULE    venlafaxine XR (EFFEXOR-XR) 75 mg, oral, Daily      No Known Allergies     Chemistry    Lab Results   Component Value Date/Time     01/09/2024 0849    K 4.4  "01/09/2024 0849     01/09/2024 0849    CO2 27 01/09/2024 0849    BUN 17 01/09/2024 0849    CREATININE 0.75 01/09/2024 0849    Lab Results   Component Value Date/Time    CALCIUM 8.6 01/09/2024 0849    ALKPHOS 75 01/09/2024 0849    AST 21 01/09/2024 0849    ALT 16 01/09/2024 0849    BILITOT 0.6 01/09/2024 0849          Lab Results   Component Value Date    HGBA1C 5.4 01/09/2024     Lab Results   Component Value Date/Time    WBC 12.6 (H) 01/09/2024 0849    HGB 13.5 01/09/2024 0849    HCT 41.8 01/09/2024 0849     01/09/2024 0849     Lab Results   Component Value Date/Time    PROTIME 11.4 04/04/2022 1524    INR 1.0 04/04/2022 1524     No results found for: \"ABORH\"  No results found for this or any previous visit (from the past 4464 hour(s)).  No results found for this or any previous visit from the past 1095 days.     Echo 9/12/2018:  Left Ventricle: The left ventricular systolic function is normal, with an estimated ejection fraction of 55-60%. The calculated ejection fraction is normal at 61 % using the Hannah's Bi-plane MOD calculation. The left ventricular cavity size is normal. Spectral Doppler shows a normal pattern of left ventricular diastolic filling.  Left Atrium: The left atrium is normal in size.  Right Ventricle: The right ventricle is normal in size. There is normal right ventriclar wall thickness. There is normal right ventricular global systolic function.  Right Atrium: The right atrium is upper limits of normal in size.  Aortic Valve: The aortic valve appears structurally normal. The aortic valve appears tricuspid and non-restricted. There is no evidence of aortic valve regurgitation. The peak instantaneous gradient of the aortic valve is 7.0 mmHg. The mean gradient of the aortic valve is 4.0 mmHg.  Mitral Valve: The mitral valve is normal in structure. There is normal mitral valve leaflet mobility. There is trace mitral valve regurgitation.  Tricuspid Valve: The tricuspid valve is " "structurally normal. There is normal tricuspid valve leaflet mobility. There is trace tricuspid regurgitation.  Pulmonic Valve: The pulmonic valve is structurally normal. There is no indication of pulmonic valve regurgitation.  Pericardium: There is no pericardial effusion noted.  Aorta: The aortic root is normal. There is no dilatation of the aortic arch. There is no dilatation of the ascending aorta. There is no dilatation of the aortic root. There is plaque visualized in the ascending aorta.  Systemic Veins: The inferior vena cava appears to be of normal size. There is IVC inspiratory collapse greater than 50%.        CONCLUSIONS:   1. The left ventricular systolic function is normal with a 55-60% estimated ejection fraction.   2. There is plaque visualized in the ascending aorta.    Nuclear stress 5/16/2017:  Summary:   1. Hypoxia of unclear etiology noted during exercise.   2. No clinical or electrocardiographic evidence for ischemia at a maximal workload.   3. Nuclear image results are reported separately.   4. The adequate level of stress was achieved.  IMPRESSION:  1. Normal stress myocardial perfusion imaging.  2. Well-maintained left ventricular function.  Estimated left  ventricular ejection fraction of 57%.    Visit Vitals  /78   Pulse 87   Temp 36.4 °C (97.5 °F) (Temporal)   Resp 16   Ht 1.651 m (5' 5\")   Wt 104 kg (229 lb 0.9 oz)   SpO2 98%   BMI 38.12 kg/m²   OB Status Hysterectomy   Smoking Status Every Day   BSA 2.18 m²     NPO/Void Status  Carbohydrate Drink Given Prior to Surgery? : N  Date of Last Liquid: 05/10/24  Time of Last Liquid: 0700  Date of Last Solid: 05/09/24  Time of Last Solid: 1830  Last Intake Type: Clear fluids  Time of Last Void: 1254         Physical Exam    Airway  Mallampati: III  TM distance: >3 FB  Neck ROM: full     Cardiovascular   Rhythm: regular  Rate: normal     Dental - normal exam     Pulmonary   Breath sounds clear to auscultation     Abdominal - normal exam   "     Other findings: 2022: easy mask, mac 3, grade 2, 1 attempt  Tongue stud removed           Anesthesia Plan    History of general anesthesia?: yes  History of complications of general anesthesia?: no    ASA 2     MAC   (With standard ASA monitoring.)  intravenous induction   Anesthetic plan and risks discussed with patient.    Plan discussed with CRNA and CAA.

## 2024-05-10 NOTE — POST-PROCEDURE NOTE
1409 Received patient from the procedure accomapanied by RN and AA. Patient sleeping, easily arousable, denies pain, abdomen soft.    1415 DR. Issa at bedside and talked to patient and sister. Patient tolerating crackers and ginger ale.    1436 Homegoing instructions reviewed with patient and sister and they verbalized understanding, copies furnished    1935 Discharged in satisfactory condition via wheelchair.

## 2024-05-13 NOTE — ANESTHESIA POSTPROCEDURE EVALUATION
Patient: Iwona Downey    Procedure Summary       Date: 05/10/24 Room / Location: [facility]    Anesthesia Start: 1318 Anesthesia Stop: 1413    Procedure: COLONOSCOPY Diagnosis: Screening for colon cancer    Scheduled Providers: Holger Issa MD; Ronaldo Alexander MD; JOVANNI Jacobs Responsible Provider: Ronaldo Alexander MD    Anesthesia Type: MAC ASA Status: 2            Anesthesia Type: MAC    Vitals Value Taken Time   /92 05/10/24 1439   Temp 36 °C (96.8 °F) 05/10/24 1409   Pulse 62 05/10/24 1439   Resp 18 05/10/24 1439   SpO2 98 % 05/10/24 1439       Anesthesia Post Evaluation    Patient location during evaluation: PACU  Patient participation: complete - patient participated  Level of consciousness: awake and alert  Pain management: adequate  Airway patency: patent  Cardiovascular status: acceptable and hemodynamically stable  Respiratory status: acceptable, spontaneous ventilation and nonlabored ventilation  Hydration status: acceptable  Postoperative Nausea and Vomiting: none        There were no known notable events for this encounter.

## 2024-05-22 LAB
LABORATORY COMMENT REPORT: NORMAL
PATH REPORT.FINAL DX SPEC: NORMAL
PATH REPORT.GROSS SPEC: NORMAL
PATH REPORT.TOTAL CANCER: NORMAL

## 2024-05-29 ENCOUNTER — ALLIED HEALTH (OUTPATIENT)
Dept: INTEGRATIVE MEDICINE | Facility: CLINIC | Age: 54
End: 2024-05-29
Payer: COMMERCIAL

## 2024-05-29 VITALS
OXYGEN SATURATION: 99 % | WEIGHT: 238 LBS | DIASTOLIC BLOOD PRESSURE: 85 MMHG | HEART RATE: 75 BPM | BODY MASS INDEX: 39.61 KG/M2 | SYSTOLIC BLOOD PRESSURE: 134 MMHG

## 2024-05-29 DIAGNOSIS — E66.01 MORBID OBESITY (MULTI): ICD-10-CM

## 2024-05-29 DIAGNOSIS — E55.9 VITAMIN D DEFICIENCY: ICD-10-CM

## 2024-05-29 DIAGNOSIS — F41.8 DEPRESSION WITH ANXIETY: Primary | ICD-10-CM

## 2024-05-29 PROCEDURE — 99417 PROLNG OP E/M EACH 15 MIN: CPT | Performed by: INTERNAL MEDICINE

## 2024-05-29 PROCEDURE — 99215 OFFICE O/P EST HI 40 MIN: CPT | Performed by: INTERNAL MEDICINE

## 2024-05-29 ASSESSMENT — ENCOUNTER SYMPTOMS
DIFFICULTY URINATING: 0
APPETITE CHANGE: 0
SHORTNESS OF BREATH: 0
FATIGUE: 1
FEVER: 0
HEADACHES: 0
SLEEP DISTURBANCE: 1
ARTHRALGIAS: 0
DEPRESSION: 1
COUGH: 1
MYALGIAS: 0
NERVOUS/ANXIOUS: 1
BACK PAIN: 0
WEAKNESS: 0
CONSTIPATION: 0
DYSURIA: 0

## 2024-05-29 NOTE — ASSESSMENT & PLAN NOTE
Continue meds  Figure out a way to exercise that does not put stress on her knee  Suggest PT for her knee pain which may help her mood issues because she may be able to work again if her knee improves.   Start b12 supplement. See AVS

## 2024-05-29 NOTE — ASSESSMENT & PLAN NOTE
Pt has been taking very high doses of vitamin d (20,000iu) per day. Worry that she could be toxic. Obtain level. Recommend taking only 5000 International Units vitamin d daily.

## 2024-05-29 NOTE — PATIENT INSTRUCTIONS
Consider quitting smoking to help your depression.  Think about the money you would save if you quit smoking  Look at the [employer].   Ask Dr. Izaguirre if a more supportive knee brace would allow you to work more. Such as a donjoy knee brace.  Try taking the topamax 50 mg by mouth at bedtime and skip the morning dose to see if you feel less sleepy during the day.   Ask about getting a nasal mask for your cpap to see if this is more comfortable. Restart wearing the mask. This is important for your anxiety and your migraines to get good sleep.   Think about getting your teeth cleaned. Suggest twice per year.  Try to eat at least twice a day. Start your day with breakfast.   Suggest take vitamin b12 500 mcg -1000 mcg daily as a sublingual tablet ideally.   Return in six weeks.   Gosia Downing MD PhD

## 2024-05-29 NOTE — ASSESSMENT & PLAN NOTE
Consider referral to weight loss clinic.   Discussed that mood is worse when restricting calories too much. She often eats only one time per day.

## 2024-05-29 NOTE — PROGRESS NOTES
Integrative Medicine Visit:     Subjective   Patient ID: Iwona Downey is a 53 y.o. female who presents for Depression and Anxiety       DepressionPatient presents with the following symptoms: nervousness/anxiety.  Patient is not experiencing: shortness of breath.      Anxiety  Symptoms include nervous/anxious behavior. Patient reports no chest pain or shortness of breath.         Depression/ anxiety/ stess: wants to help herself with this. Worries about everything. She does not have anyone to depend on. Her sister is her closest relative. Has money struggles. Trying to maintain her house. She is only working two hours per day due to knee pain. Knee verona when she walks and she was denied physical therapy by [facility]. She was referred May 13, 2024.       She reprots taking vitamin d 20,000 International Units daily. (?)   Lab Results   Component Value Date    VITD25 28 (A) 02/24/2023       CONCERNS:  wants help with her anxiety and depression.     PMH:  partial hysterectomy for fibroids  Very heavy periods.   Osteoarthritis- surgery on both of her knees, hip pain,   Migraines- frequently.   Periodontal disease    [facility]:  cancer- breast cancer, prostate cancer  Diabetes in mom's father  Dementia; mom's mother  hypertension    SOC:  works as a . Lives alone. No kids. . Has two grown step kids from her prior marriage. Financial stress because her knee is so painful and she can only work 2 hours per day as a .     NUTRITION: breakfast- skipped  Dinner: pork chop and candied yams and mac and cheese water  Lunch: nothing.       Smoking:  smoker  (1/2 ppd x 40 yrs)  wants to quit smoking. Finds smoking to be a relaxation. Has tried to vape in the past and this did not work. Takes patches which helped for two weeks.     Alcohol use:  none    Exercise:  not at all cannot walk every day as she used to do. Does her knee exercises daily.     SLEEP: has trouble staying a sleep. Snores. Has sleep  apnea. Supposed to wear cpap. Does not use. Cpap is uncomfortable. Mask slips off.     STRESS MANAGEMENT: no specific practices  SUPPORT: her one sister. Also has half sister.  Feels like she lacks enough support right now.     Review of Systems   Constitutional:  Positive for fatigue. Negative for appetite change and fever.   HENT:  Negative for congestion and hearing loss.    Eyes:  Negative for visual disturbance.   Respiratory:  Positive for cough. Negative for shortness of breath.    Cardiovascular:  Negative for chest pain and leg swelling.   Gastrointestinal:  Negative for constipation.   Genitourinary:  Negative for difficulty urinating, dysuria and urgency.   Musculoskeletal:  Negative for arthralgias, back pain and myalgias.   Skin:  Negative for rash.   Neurological:  Negative for weakness and headaches.   Psychiatric/Behavioral:  Positive for depression and sleep disturbance. Negative for behavioral problems. The patient is nervous/anxious.             Pain:    Objective   /85 (BP Location: Left arm, Patient Position: Sitting, BP Cuff Size: Adult)   Pulse 75   Wt 108 kg (238 lb)   SpO2 99%   BMI 39.61 kg/m²       Physical Exam  Constitutional:       Appearance: She is obese.   HENT:      Head: Normocephalic and atraumatic.      Mouth/Throat:      Mouth: Mucous membranes are moist.   Cardiovascular:      Rate and Rhythm: Normal rate.   Pulmonary:      Effort: Pulmonary effort is normal. No respiratory distress.   Musculoskeletal:         General: No swelling or deformity.      Cervical back: Normal range of motion.   Skin:     General: Skin is dry.      Findings: No rash.   Neurological:      General: No focal deficit present.      Mental Status: She is alert and oriented to person, place, and time.   Psychiatric:         Mood and Affect: Mood normal.         Behavior: Behavior normal.         Thought Content: Thought content normal.      Comments: Normal affect                       Assessment/Plan     Problem List Items Addressed This Visit             ICD-10-CM    Depression with anxiety - Primary F41.8     Continue meds  Figure out a way to exercise that does not put stress on her knee  Suggest PT for her knee pain which may help her mood issues because she may be able to work again if her knee improves.   Start b12 supplement. See AVS          Morbid obesity (Multi) E66.01     Consider referral to weight loss clinic.   Discussed that mood is worse when restricting calories too much. She often eats only one time per day.          Vitamin D deficiency E55.9     Pt has been taking very high doses of vitamin d (20,000iu) per day. Worry that she could be toxic. Obtain level. Recommend taking only 5000 International Units vitamin d daily.          Relevant Orders    Vitamin D 25-Hydroxy,Total (for eval of Vitamin D levels)           Recommend Follow up in : six weeks    Gosia Downing MD PhD    Time Spent  Prep time on day of patient encounter: 5 minutes  Time spent directly with patient, family or caregiver: 60 minutes  Additional Time Spent on Patient Care Activities: 0 minutes  Documentation Time: 4 minutes  Other Time Spent: 0 minutes  Total: 69 minutes

## 2024-05-30 DIAGNOSIS — L70.0 ACNE VULGARIS: Primary | ICD-10-CM

## 2024-05-30 RX ORDER — CLINDAMYCIN PHOSPHATE 10 UG/ML
LOTION TOPICAL
Qty: 60 ML | Refills: 0 | Status: SHIPPED | OUTPATIENT
Start: 2024-05-30

## 2024-05-30 RX ORDER — BENZOYL PEROXIDE 50 MG/ML
LIQUID TOPICAL
Qty: 142 G | Refills: 0 | Status: SHIPPED | OUTPATIENT
Start: 2024-05-30

## 2024-06-01 DIAGNOSIS — F33.1 MODERATE EPISODE OF RECURRENT MAJOR DEPRESSIVE DISORDER (MULTI): ICD-10-CM

## 2024-06-01 DIAGNOSIS — F41.8 DEPRESSION WITH ANXIETY: ICD-10-CM

## 2024-06-04 RX ORDER — VENLAFAXINE HYDROCHLORIDE 37.5 MG/1
37.5 CAPSULE, EXTENDED RELEASE ORAL DAILY
Qty: 90 CAPSULE | Refills: 0 | Status: SHIPPED | OUTPATIENT
Start: 2024-06-04

## 2024-06-22 ENCOUNTER — APPOINTMENT (OUTPATIENT)
Dept: NEUROLOGY | Facility: CLINIC | Age: 54
End: 2024-06-22
Payer: COMMERCIAL

## 2024-06-27 DIAGNOSIS — L70.0 ACNE VULGARIS: ICD-10-CM

## 2024-06-29 RX ORDER — CLINDAMYCIN PHOSPHATE 10 UG/ML
LOTION TOPICAL
Qty: 60 ML | Refills: 0 | Status: SHIPPED | OUTPATIENT
Start: 2024-06-29

## 2024-07-01 ENCOUNTER — APPOINTMENT (OUTPATIENT)
Dept: INTEGRATIVE MEDICINE | Facility: CLINIC | Age: 54
End: 2024-07-01
Payer: COMMERCIAL

## 2024-07-01 DIAGNOSIS — R53.82 CHRONIC FATIGUE: Primary | ICD-10-CM

## 2024-07-01 DIAGNOSIS — F17.200 TOBACCO DEPENDENCE: ICD-10-CM

## 2024-07-01 DIAGNOSIS — E66.01 MORBID OBESITY (MULTI): ICD-10-CM

## 2024-07-01 PROCEDURE — 99214 OFFICE O/P EST MOD 30 MIN: CPT | Performed by: INTERNAL MEDICINE

## 2024-07-01 ASSESSMENT — ENCOUNTER SYMPTOMS
SHORTNESS OF BREATH: 0
SLEEP DISTURBANCE: 1
APPETITE CHANGE: 0
HEADACHES: 0
DIFFICULTY URINATING: 0
DYSURIA: 0
BACK PAIN: 0
FEVER: 0
ARTHRALGIAS: 0
FATIGUE: 1
WEAKNESS: 0
NERVOUS/ANXIOUS: 1
COUGH: 1
CONSTIPATION: 0
MYALGIAS: 0

## 2024-07-01 NOTE — PATIENT INSTRUCTIONS
Try eating a piece of fruit in the morning on the way to work.   Get your blood work.   Start to eat your dinner earlier. See if this helps your sleep.  Continue to strive to eat twice per day.     Follow up in a month.   Gosia Downing MD PhD

## 2024-07-01 NOTE — PROGRESS NOTES
"Integrative Medicine Follow-up Visit :     Subjective   Patient ID: Iwona Downey is a 53 y.o. female who presents for Depression and Anxiety       HPI  Still working on eating in the morning. She is \"lazy\". Ate a bagel with cream cheese and cannot always find the time to eat in the morning. Does not like oatmeal. Will not eat bananas. Does not like peanut butter.   She is working on drinking more water.   She forgot to get her vitamin d lab done.  Taking vitamin d 5000 international units  daily.     She is taking the b12 every morning.     She is getting food from the food bank now. She is trying to eat twice per day. Still sits at home and feels too tired to prepare a meal.   She has found that she continue to wake in the middle of the night. Is eating a meal at 10 pm sometimes right before bedtime.          Pain:still joint pain    Review of Systems   Constitutional:  Positive for fatigue. Negative for appetite change and fever.   HENT:  Negative for congestion and hearing loss.    Eyes:  Negative for visual disturbance.   Respiratory:  Positive for cough. Negative for shortness of breath.    Cardiovascular:  Negative for chest pain and leg swelling.   Gastrointestinal:  Negative for constipation.   Genitourinary:  Negative for difficulty urinating, dysuria and urgency.   Musculoskeletal:  Negative for arthralgias, back pain and myalgias.   Skin:  Negative for rash.   Neurological:  Negative for weakness and headaches.   Psychiatric/Behavioral:  Positive for sleep disturbance. Negative for behavioral problems. The patient is nervous/anxious.                   Objective   There were no vitals taken for this visit.    Physical Exam  Constitutional:       Appearance: She is obese.   HENT:      Head: Normocephalic and atraumatic.      Mouth/Throat:      Mouth: Mucous membranes are moist.   Cardiovascular:      Rate and Rhythm: Normal rate.   Pulmonary:      Effort: Pulmonary effort is normal. No respiratory " distress.   Musculoskeletal:         General: No swelling or deformity.      Cervical back: Normal range of motion.   Skin:     General: Skin is dry.      Findings: No rash.   Neurological:      General: No focal deficit present.      Mental Status: She is alert and oriented to person, place, and time.   Psychiatric:         Mood and Affect: Mood normal.         Behavior: Behavior normal.         Thought Content: Thought content normal.      Comments: Normal affect                      Assessment/Plan     Problem List Items Addressed This Visit             ICD-10-CM    Fatigue - Primary R53.83     Continue to work on sleep habits.           Morbid obesity (Multi) E66.01     Diet discussed.          Tobacco dependence F17.200     Advised smoking cessation.               Recommend Follow up in : August 5 at 2:15 pm.     Gosia Downing MD PhD    Time Spent  Prep time on day of patient encounter: 5 minutes  Time spent directly with patient, family or caregiver: 25 minutes  Additional Time Spent on Patient Care Activities: 0 minutes  Documentation Time: 5 minutes  Other Time Spent: 0 minutes  Total: 35 minutes

## 2024-07-10 ENCOUNTER — HOSPITAL ENCOUNTER (OUTPATIENT)
Dept: RADIOLOGY | Facility: CLINIC | Age: 54
Discharge: HOME | End: 2024-07-10
Payer: COMMERCIAL

## 2024-07-10 DIAGNOSIS — R92.8 OTHER ABNORMAL AND INCONCLUSIVE FINDINGS ON DIAGNOSTIC IMAGING OF BREAST: ICD-10-CM

## 2024-07-10 DIAGNOSIS — R92.8 ABNORMALITY OF RIGHT BREAST ON SCREENING MAMMOGRAM: ICD-10-CM

## 2024-07-10 PROCEDURE — 77061 BREAST TOMOSYNTHESIS UNI: CPT | Mod: RIGHT SIDE | Performed by: STUDENT IN AN ORGANIZED HEALTH CARE EDUCATION/TRAINING PROGRAM

## 2024-07-10 PROCEDURE — 76642 ULTRASOUND BREAST LIMITED: CPT | Mod: RT

## 2024-07-10 PROCEDURE — 77065 DX MAMMO INCL CAD UNI: CPT | Mod: RIGHT SIDE | Performed by: STUDENT IN AN ORGANIZED HEALTH CARE EDUCATION/TRAINING PROGRAM

## 2024-07-10 PROCEDURE — 76983 USE EA ADDL TARGET LESION: CPT | Mod: RT

## 2024-07-10 PROCEDURE — 77061 BREAST TOMOSYNTHESIS UNI: CPT | Mod: RT

## 2024-07-10 PROCEDURE — 76642 ULTRASOUND BREAST LIMITED: CPT | Mod: RIGHT SIDE | Performed by: STUDENT IN AN ORGANIZED HEALTH CARE EDUCATION/TRAINING PROGRAM

## 2024-08-05 ENCOUNTER — APPOINTMENT (OUTPATIENT)
Dept: INTEGRATIVE MEDICINE | Facility: CLINIC | Age: 54
End: 2024-08-05
Payer: COMMERCIAL
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
## 2024-08-13 ENCOUNTER — OFFICE VISIT (OUTPATIENT)
Dept: SURGICAL ONCOLOGY | Facility: CLINIC | Age: 54
End: 2024-08-13
Payer: COMMERCIAL

## 2024-08-13 ENCOUNTER — HOSPITAL ENCOUNTER (OUTPATIENT)
Dept: RADIOLOGY | Facility: CLINIC | Age: 54
Discharge: HOME | End: 2024-08-13
Payer: COMMERCIAL

## 2024-08-13 VITALS
HEART RATE: 86 BPM | WEIGHT: 232 LBS | BODY MASS INDEX: 38.61 KG/M2 | SYSTOLIC BLOOD PRESSURE: 136 MMHG | DIASTOLIC BLOOD PRESSURE: 86 MMHG

## 2024-08-13 DIAGNOSIS — R92.8 ABNORMAL MAMMOGRAM: Primary | ICD-10-CM

## 2024-08-13 DIAGNOSIS — N63.10 MASS OF RIGHT BREAST, UNSPECIFIED QUADRANT: Primary | ICD-10-CM

## 2024-08-13 DIAGNOSIS — R92.8 OTHER ABNORMAL AND INCONCLUSIVE FINDINGS ON DIAGNOSTIC IMAGING OF BREAST: ICD-10-CM

## 2024-08-13 DIAGNOSIS — N63.10 BREAST MASS, RIGHT: ICD-10-CM

## 2024-08-13 PROCEDURE — 99214 OFFICE O/P EST MOD 30 MIN: CPT

## 2024-08-13 PROCEDURE — 19083 BX BREAST 1ST LESION US IMAG: CPT | Mod: RT

## 2024-08-13 PROCEDURE — 77065 DX MAMMO INCL CAD UNI: CPT

## 2024-08-13 PROCEDURE — 3079F DIAST BP 80-89 MM HG: CPT

## 2024-08-13 PROCEDURE — 77065 DX MAMMO INCL CAD UNI: CPT | Mod: RIGHT SIDE | Performed by: RADIOLOGY

## 2024-08-13 PROCEDURE — 2500000005 HC RX 250 GENERAL PHARMACY W/O HCPCS: Performed by: RADIOLOGY

## 2024-08-13 PROCEDURE — 19083 BX BREAST 1ST LESION US IMAG: CPT | Mod: RIGHT SIDE | Performed by: RADIOLOGY

## 2024-08-13 PROCEDURE — 3075F SYST BP GE 130 - 139MM HG: CPT

## 2024-08-13 PROCEDURE — 2720000007 HC OR 272 NO HCPCS

## 2024-08-13 PROCEDURE — 99204 OFFICE O/P NEW MOD 45 MIN: CPT

## 2024-08-13 PROCEDURE — A4648 IMPLANTABLE TISSUE MARKER: HCPCS

## 2024-08-13 ASSESSMENT — PAIN SCALES - GENERAL
PAINLEVEL_OUTOF10: 0 - NO PAIN
PAINLEVEL_OUTOF10: 0 - NO PAIN
PAINLEVEL: 0-NO PAIN
PAINLEVEL_OUTOF10: 0 - NO PAIN

## 2024-08-13 ASSESSMENT — PAIN - FUNCTIONAL ASSESSMENT
PAIN_FUNCTIONAL_ASSESSMENT: 0-10

## 2024-08-13 NOTE — PATIENT INSTRUCTIONS
I recommend a right breast and right axilla ultrasound guided biopsy. A breast radiology physician will perform the biopsy. Possible diagnoses include benign, atypical, or cancer as we discussed.  Bruising and mild discomfort after the biopsy is normal and will improve. I normally have results in 7-10 business days. I will call you with results, please have your phone handy to take my call.    IMPORTANT INFORMATION REGARDING YOUR RESULTS    If you receive medical information from My [facility] Personal Health Record (online chart) your results will be released into your chart. This means you may view or see results of your biopsy or procedure before I contact you directly. If this occurs, please call the office and we will discuss your results over the phone.    You can see your health information, review clinical summaries from office visits & test results online when you follow your health with MY  Chart, a personal health record. To sign up go to [email or web address]. If you need assistance with signing up or trouble getting into your account call [employer] Patient Line 24/7 at [phone].    My office phone number is [phone] if you need to get in touch with me or have additional questions or concerns. Thank you for choosing [facility] and trusting me as your healthcare provider. I look forward to seeing you again at your next office visit. I am honored to be a provider on your health care team and I remain dedicated to helping you achieve your health goals.Proceed to biopsy. A breast radiology physician will perform the biopsy. Results are usually available in about 7-10 business days. I will call patient with results and instruct on next steps and plan.

## 2024-08-13 NOTE — DISCHARGE INSTRUCTIONS
1215 AFTER THE TEST  A steri-strip and bandage will be placed over the incision. You may shower after 24 hours. Remove bandage after 24 hours. Remove bandage after the shower. Leave the steri-strips in place to fall off on their own. If after 1 week the steri-strips are still on, you may remove them. Avoid swimming or soaking in tub for 3 days.     You may have mild discomfort at the test site. If needed, you may take Tylenol (Acetaminophen) for pain. Please avoid taking NSAIDs, Motrin, Advil, Aleve, or ibuprofen for 24 hours following the biopsy. After 24 hours you may resume NSAIDSs.     If you take aspirin, Plavix, Coumadin, Xarelto or Eliquis please tell us. If these medications were stopped by your provider, please ask them when to resume.     You may have some tenderness, bruising or slight bleeding at the site. Please apply ice packs to the site for 15 minutes on and 15 minutes off for a 2 hour minimum.     Most people can return to their usual routine after the procedure. Avoid Strenuous activity for 24 hours.     Sleep in a bra the night after your biopsy. Continue to do so for comfort.     Call your provider if you have any of the following symptoms :  Fever  Increased pain  Increased bleeding  Redness  Increased swelling  Yellowish drainage  Your provider will get the biopsy results within 5 - 7 days. Call your provider with any questions.     Patient education brochure and pain/comfort measures have been reviewed.   Phone number provided to contact Breast Center if problems arise.     Patient verbalized understanding of home going instructions.   DC home in care of daughter at 1220pm

## 2024-08-13 NOTE — Clinical Note
Procedural steps explained and patient given opportunity to verbalize concerns and seek clarification.  Post procedure self-care and potential for bruising , hematoma, and pain reviewed.  Patient verbalizes understanding. Patient offered aromatherapy, warm blankets and music. Guided imagery, touch and relaxation breathing to be used throughout the procedure.

## 2024-08-16 LAB
LABORATORY COMMENT REPORT: NORMAL
PATH REPORT.FINAL DX SPEC: NORMAL
PATH REPORT.GROSS SPEC: NORMAL
PATH REPORT.RELEVANT HX SPEC: NORMAL
PATH REPORT.TOTAL CANCER: NORMAL

## 2024-08-20 ENCOUNTER — TELEPHONE (OUTPATIENT)
Dept: SURGICAL ONCOLOGY | Facility: HOSPITAL | Age: 54
End: 2024-08-20
Payer: COMMERCIAL

## 2024-08-20 DIAGNOSIS — Z80.3 FAMILY HISTORY OF BREAST CANCER: Primary | ICD-10-CM

## 2024-08-20 NOTE — TELEPHONE ENCOUNTER
"Result Communication    Resulted Orders   Surgical Pathology Exam   Result Value Ref Range    Case Report       Surgical Pathology                                Case: [id number]                                  Authorizing Provider:  DOMINICK Hurtado   Collected:           08/13/2024 1138              Ordering Location:     [facility]       Received:            08/13/2024 1207                                     Center                                                                       Pathologist:           Ila Shahid MD, MS                                                            Specimen:    BREAST CORE BIOPSY RIGHT, RIGHT BREAST 1:00 9 CM FN                                        FINAL DIAGNOSIS       A. Right breast, 1:00, 9 cm from nipple, ultrasound-guided core needle biopsy:  - Cluster of sclerosing adenosis with microcysts.              By the signature on this report, the individual or group listed as making the Final Interpretation/Diagnosis certifies that they have reviewed this case.       Clinical History       RIGHT BREAST 1:00 9 CM FN      Gross Description       A: Received in formalin, labeled with the patient´s name and hospital number and \"right breast 1:00 9 cm FN\", are multiple irregular/cylindrical segments of yellow-white fatty soft tissue aggregating to 1.0 x 0.9 x 0.3 cm.  The specimen is submitted in toto in two cassettes.  MRS    NOTE:  Ischemia time: Not provided.  This specimen was placed into formalin at: 8/13/24 11:38.         4:26 PM      Results were successfully communicated with the patient and they acknowledged their understanding. Will be seen in high risk clinic.    "

## 2024-08-20 NOTE — TELEPHONE ENCOUNTER
Result Communication    Resulted Orders   BI US guided breast localization and biopsy right    Addendum: 8/16/2024    Interpreted By:  Bethany Beck,   ADDENDUM:  The final pathology for right breast mass at the 1 o'clock position 9  cm from the nipple is cluster of sclerosing adenosis with microcysts.  This benign diagnosis is concordant with the imaging features of the  area biopsied.      Routine annual mammographic follow-up recommended.      Critical Finding:  See findings. Notification was initiated on  8/16/2024 at 5:07 pm by  Bethany Beck.  (**-YCF-**) Instructions:          Signed by: Bethany Beck 8/16/2024 5:07 PM      -------- ORIGINAL REPORT --------  Dictation workstation:   [id number]      Narrative    Interpreted By:  Bethany Beck,   STUDY:  BI US GUIDED BREAST LOCALIZATION AND BIOPSY RIGHT; BI BREAST BIOPSY  CLIP IMAGING;  8/13/2024 11:05 am; 8/13/2024 12:05 pm      ACCESSION NUMBER(S):  [id number]; [id number]      ORDERING CLINICIAN:  SELVIN RENTERIA      INDICATION:  Indeterminate right breast mass, 1 o'clock position 9 cm from the  nipple. Borderline right axillary lymph node.      FINDINGS:  Prior to the procedure, repeat sonographic evaluation of the right  axilla was performed, as the diagnostic images of 07/10/2024 suggest  benign lymph node. Repeat scanning of the right axilla confirms lymph  node 2 2 appear oval circumscribed parallel with a prominent fatty  hilum and a uniform thin hypoechoic cortex within the normal range.  No cortical thickening or nodularity. Therefore, axillary biopsy was  not performed.      PREPROCEDURAL CONSULTATION: The history and physical exam pertinent  to the procedure were reviewed and no updates were made.      The procedure was explained to the patient including the risks,  benefits, and alternatives. Medications were discussed, including any  prior use of blood thinning medications by the patient. Patient  allergies were reviewed. The risks, including but  not limited to  infection and bleeding, were reviewed by the performing physician and  the patient agreed to undergo the procedure.      Prior to the procedure, an audible timeout was done to verify patient  identification, site and type of procedure. Dr. Bethany Beck, a  radiology nurse and an ultrasound technologist were present.      PROCEDURE: Scanning of the right breast redemonstrated the mass of  concern in the 1 o'clock position, 9 cm from the nipple. The right  breast was marked under ultrasound guidance and cleansed.  7 mL of  buffered 1% lidocaine was injected subcutaneously and then into the  deeper tissues surrounding the mass. A small incision was made.  4  tissue core specimens were then obtained with a 12-gauge  vacuum-assisted biopsy needle with minimal bleeding (estimated blood  loss less than 10 mL).  A butterfly Hydromark tissue marker was then  placed into the biopsy site.  Hemostasis was achieved with manual  compression. The patient tolerated the procedure without difficulty.      The postbiopsy mammogram demonstrates satisfactory placement of the  tissue marker directly within the mass of concern..      The patient experienced no complications during the procedure.  Home-going/follow-up instructions were reviewed with the patient  before she left the department.        Impression    Status post ultrasound guided core needle biopsy of a right breast  mass followed by tissue marker placement. Pathology is pending.      POST PROCEDURE MAMMOGRAM FOR MARKER PLACEMENT.      MACRO:  None      Signed by: Bethany Beck 8/13/2024 12:08 PM  Dictation workstation:   [id number]       4:25 PM      Results were successfully communicated with the patient and they acknowledged their understanding.

## 2024-09-13 ENCOUNTER — TRANSCRIBE ORDERS (OUTPATIENT)
Dept: ORTHOPEDIC SURGERY | Facility: HOSPITAL | Age: 54
End: 2024-09-13
Payer: COMMERCIAL

## 2024-09-13 DIAGNOSIS — M54.12 CERVICAL RADICULOPATHY: ICD-10-CM

## 2024-09-17 ENCOUNTER — OFFICE VISIT (OUTPATIENT)
Dept: ORTHOPEDIC SURGERY | Facility: CLINIC | Age: 54
End: 2024-09-17
Payer: COMMERCIAL

## 2024-09-17 ENCOUNTER — HOSPITAL ENCOUNTER (OUTPATIENT)
Dept: RADIOLOGY | Facility: CLINIC | Age: 54
Discharge: HOME | End: 2024-09-17
Payer: COMMERCIAL

## 2024-09-17 DIAGNOSIS — G56.02 CARPAL TUNNEL SYNDROME OF LEFT WRIST: ICD-10-CM

## 2024-09-17 DIAGNOSIS — M54.12 CERVICAL RADICULOPATHY: ICD-10-CM

## 2024-09-17 DIAGNOSIS — M51.36 DISCOGENIC LOW BACK PAIN: Primary | ICD-10-CM

## 2024-09-17 PROCEDURE — 72050 X-RAY EXAM NECK SPINE 4/5VWS: CPT | Performed by: RADIOLOGY

## 2024-09-17 PROCEDURE — 99213 OFFICE O/P EST LOW 20 MIN: CPT

## 2024-09-17 PROCEDURE — 72050 X-RAY EXAM NECK SPINE 4/5VWS: CPT

## 2024-09-17 ASSESSMENT — PAIN SCALES - GENERAL: PAINLEVEL_OUTOF10: 7

## 2024-09-17 ASSESSMENT — PAIN DESCRIPTION - DESCRIPTORS: DESCRIPTORS: ACHING;THROBBING

## 2024-09-17 ASSESSMENT — PAIN - FUNCTIONAL ASSESSMENT: PAIN_FUNCTIONAL_ASSESSMENT: 0-10

## 2024-09-18 NOTE — PROGRESS NOTES
HPI:  Iwona Downey is a 53-year-old female who presents today with left hand numbness and tingling in the second, third, fourth fingers.  She has a history of a C5-C7 ACDF with Dr. Cosby.  Her symptoms are worse in the morning and improves throughout the day.  She denies symptoms in the right arm.  Previously, Dr. Ovalles did injections for carpal tunnel about 6 months ago, which helped for a significant portion at that time.  She is scheduled to see Dr. Ovalles again for repeat injection.    Additionally, she states coming and going thoracolumbar back pain which is somewhat daily.  She states that it is random in nature.  She denies any radicular pain in the legs, denies numbness and tingling in the legs.  She does, however, have a left meniscus tear that is currently being planned for repair.  She is currently on gabapentin, meloxicam, and tizanidine.  Meloxicam and tizanidine help with back pain.  She would like any recommendations for her back pain at this time.    ROS:  Reviewed on EMR and patient intake sheet.    PMH/SH:  Reviewed on EMR and patient intake sheet.    Exam:  MSK: Full strength range of motion of upper and lower extremities bilaterally.  Negative Mansi's, negative Spurling's.  Negative straight leg raise bilaterally.  Mild tenderness palpation over the thoracic and lumbar spine midline and paraspinal muscles.  General: No acute distress. Awake and conversant.  Eyes: Normal conjunctiva, anicteric. Round symmetric pupils.  ENT: Hearing grossly intact. No nasal discharge.  Neck: Neck is supple. No masses or thyromegaly.  Respiratory: Respirations are non-labored. No wheezing.  Skin: Warm. No rashes or ulcers.  Psych: Alert and oriented. Cooperative, appropriate mood and affect, normal judgement.  CV: No lower extremity edema.  Neuro: Sensation and CN II-XII grossly normal.    Radiology:     Cervical spine x-rays personally reviewed and demonstrates C5-C7 ACDF with instrumentation and fusion  seated appropriately.  No signs of hardware failure or malalignment.  Adjacent level disc degeneration at C4/5 noted.    Diagnosis:    Carpal tunnel, left  Back spasms  Discogenic back pain    Assessment and Plan:  Patient was seen today evaluate for returning left-sided carpal tunnel symptoms.  At this time, I reviewed her x-rays with her, and pointed out adjacent level degeneration at C3-4/C5.  However, it is very likely that her symptoms are as a result of carpal tunnel and not cervical radiculopathy.  I recommended following up with Dr. Ovallse for repeat injection.    Additionally, we discussed her back pain and recommended continuing conservative management of walking, weight management, core strengthening, physical therapy, and pharmacologic management with tizanidine, meloxicam, and over-the-counter Tylenol.    Patient may follow-up on an as-needed basis.  Patient feels all questions were answered today.  Patient agrees to the plan above.    Ryan Ramirez PA-C  Department of Orthopaedic Surgery  1:14 PM  09/18/24    [address] [place] [address]    Voicemail: [phone]   Appts: [phone]  Fax: [phone]

## 2024-09-26 ENCOUNTER — APPOINTMENT (OUTPATIENT)
Dept: PRIMARY CARE | Facility: CLINIC | Age: 54
End: 2024-09-26
Payer: COMMERCIAL

## 2024-09-26 VITALS
OXYGEN SATURATION: 94 % | HEART RATE: 76 BPM | BODY MASS INDEX: 38.74 KG/M2 | DIASTOLIC BLOOD PRESSURE: 75 MMHG | WEIGHT: 232.5 LBS | HEIGHT: 65 IN | SYSTOLIC BLOOD PRESSURE: 124 MMHG

## 2024-09-26 DIAGNOSIS — Z23 NEED FOR INFLUENZA VACCINATION: ICD-10-CM

## 2024-09-26 DIAGNOSIS — M17.0 OSTEOARTHRITIS OF BOTH KNEES, UNSPECIFIED OSTEOARTHRITIS TYPE: ICD-10-CM

## 2024-09-26 DIAGNOSIS — M79.7 FIBROMYALGIA: ICD-10-CM

## 2024-09-26 DIAGNOSIS — I10 PRIMARY HYPERTENSION: ICD-10-CM

## 2024-09-26 DIAGNOSIS — E55.9 VITAMIN D INSUFFICIENCY: ICD-10-CM

## 2024-09-26 DIAGNOSIS — J41.0 SIMPLE CHRONIC BRONCHITIS (MULTI): ICD-10-CM

## 2024-09-26 DIAGNOSIS — M47.22 OSTEOARTHRITIS OF SPINE WITH RADICULOPATHY, CERVICAL REGION: ICD-10-CM

## 2024-09-26 DIAGNOSIS — F17.210 CIGARETTE NICOTINE DEPENDENCE WITHOUT COMPLICATION: ICD-10-CM

## 2024-09-26 DIAGNOSIS — R91.8 PULMONARY NODULES: Primary | ICD-10-CM

## 2024-09-26 DIAGNOSIS — F41.8 DEPRESSION WITH ANXIETY: ICD-10-CM

## 2024-09-26 DIAGNOSIS — J45.40 MODERATE PERSISTENT ASTHMA WITHOUT COMPLICATION (HHS-HCC): ICD-10-CM

## 2024-09-26 DIAGNOSIS — Z98.1 S/P CERVICAL SPINAL FUSION: ICD-10-CM

## 2024-09-26 DIAGNOSIS — M46.1 DEGENERATIVE JOINT DISEASE OF SACROILIAC JOINT (CMS-HCC): ICD-10-CM

## 2024-09-26 DIAGNOSIS — F33.1 MODERATE EPISODE OF RECURRENT MAJOR DEPRESSIVE DISORDER: ICD-10-CM

## 2024-09-26 PROCEDURE — 90471 IMMUNIZATION ADMIN: CPT | Performed by: STUDENT IN AN ORGANIZED HEALTH CARE EDUCATION/TRAINING PROGRAM

## 2024-09-26 PROCEDURE — 3078F DIAST BP <80 MM HG: CPT | Performed by: STUDENT IN AN ORGANIZED HEALTH CARE EDUCATION/TRAINING PROGRAM

## 2024-09-26 PROCEDURE — 4004F PT TOBACCO SCREEN RCVD TLK: CPT | Performed by: STUDENT IN AN ORGANIZED HEALTH CARE EDUCATION/TRAINING PROGRAM

## 2024-09-26 PROCEDURE — 90656 IIV3 VACC NO PRSV 0.5 ML IM: CPT | Performed by: STUDENT IN AN ORGANIZED HEALTH CARE EDUCATION/TRAINING PROGRAM

## 2024-09-26 PROCEDURE — 3074F SYST BP LT 130 MM HG: CPT | Performed by: STUDENT IN AN ORGANIZED HEALTH CARE EDUCATION/TRAINING PROGRAM

## 2024-09-26 PROCEDURE — 99214 OFFICE O/P EST MOD 30 MIN: CPT | Performed by: STUDENT IN AN ORGANIZED HEALTH CARE EDUCATION/TRAINING PROGRAM

## 2024-09-26 PROCEDURE — 3008F BODY MASS INDEX DOCD: CPT | Performed by: STUDENT IN AN ORGANIZED HEALTH CARE EDUCATION/TRAINING PROGRAM

## 2024-09-26 RX ORDER — MELOXICAM 15 MG/1
15 TABLET ORAL DAILY
Qty: 90 TABLET | Refills: 1 | Status: SHIPPED | OUTPATIENT
Start: 2024-09-26

## 2024-09-26 RX ORDER — FLUTICASONE PROPIONATE AND SALMETEROL 250; 50 UG/1; UG/1
1 POWDER RESPIRATORY (INHALATION) EVERY 12 HOURS
Qty: 60 EACH | Refills: 11 | Status: SHIPPED | OUTPATIENT
Start: 2024-09-26

## 2024-09-26 RX ORDER — VENLAFAXINE HYDROCHLORIDE 37.5 MG/1
37.5 CAPSULE, EXTENDED RELEASE ORAL DAILY
Qty: 90 CAPSULE | Refills: 3 | Status: SHIPPED | OUTPATIENT
Start: 2024-09-26

## 2024-09-26 RX ORDER — TOPIRAMATE 50 MG/1
50 TABLET, FILM COATED ORAL 2 TIMES DAILY
Qty: 180 TABLET | Refills: 3 | Status: SHIPPED | OUTPATIENT
Start: 2024-09-26

## 2024-09-26 RX ORDER — HYDROCHLOROTHIAZIDE 25 MG/1
25 TABLET ORAL DAILY
Qty: 90 TABLET | Refills: 1 | Status: SHIPPED | OUTPATIENT
Start: 2024-09-26

## 2024-09-26 RX ORDER — GABAPENTIN 300 MG/1
600 CAPSULE ORAL 3 TIMES DAILY
Qty: 180 CAPSULE | Refills: 1 | Status: SHIPPED | OUTPATIENT
Start: 2024-09-26

## 2024-09-26 RX ORDER — VENLAFAXINE HYDROCHLORIDE 75 MG/1
75 CAPSULE, EXTENDED RELEASE ORAL DAILY
Qty: 90 CAPSULE | Refills: 3 | Status: SHIPPED | OUTPATIENT
Start: 2024-09-26

## 2024-09-26 RX ORDER — ALBUTEROL SULFATE 90 UG/1
2 INHALANT RESPIRATORY (INHALATION) EVERY 4 HOURS PRN
Qty: 18 G | Refills: 3 | Status: SHIPPED | OUTPATIENT
Start: 2024-09-26

## 2024-09-26 NOTE — PROGRESS NOTES
"Subjective   Patient ID: Iwona Downey is a 54 y.o. female who presents for Follow-up (7 month follow-up. Discuss implant in right breast and mammogram. ).        HPI      55 y/o female with asthma, current every day smoker, HTN, migraine HA ,  h/o  intractable cervical radiculopathy secondary to cervical stenosis s/p C5-7 ACDF ( April 2022), mild DJD of SI joints , DJD of knee joints ( managed at [facility] , no recent Xrays to review ) and hips           1/2 ppd now   Has not filled tramadol since jan 2024   Need refills   HTN  Asthma/ copd   Chronic pain            Visit Vitals  /75   Pulse 76   Ht 1.651 m (5' 5\")   Wt 105 kg (232 lb 8 oz)   SpO2 94%   BMI 38.69 kg/m²   OB Status Hysterectomy   Smoking Status Every Day   BSA 2.19 m²      No LMP recorded. Patient has had a hysterectomy.   Current Outpatient Medications   Medication Instructions    albuterol (ProAir HFA) 90 mcg/actuation inhaler 2 puffs, inhalation, Every 4 hours PRN    atorvastatin (LIPITOR) 10 mg, oral, Nightly    benzoyl peroxide 5 % external wash WASH AFFECTED AREAS ONCE DAILY IN THE MORNING    cholecalciferol (Vitamin D-3) 50 mcg (2,000 unit) capsule oral    clindamycin (Cleocin T) 1 % lotion APPLY TOPICALLY TO AFFECTED AREA TWICE A DAY OR UP TO 3-4 TIMES PER DAY AS NEEDED FOR ACTIVE LESIONS    cyanocobalamin (Vitamin B-12) 1,000 mcg tablet 1 tablet, oral, Daily    fluticasone (Flonase) 50 mcg/actuation nasal spray ADMINISTER 1 SPRAY INTO EACH NOSTRIL ONCE DAILY. 1 TO 2 SPRAYS (TS 11-07)    fluticasone propion-salmeteroL (Advair Diskus) 250-50 mcg/dose diskus inhaler 1 puff, inhalation, Every 12 hours, Rinse mouth after each use    gabapentin (NEURONTIN) 600 mg, oral, 3 times daily    hydroCHLOROthiazide (HYDRODIURIL) 25 mg, oral, Daily    meclizine (Antivert) 25 mg tablet oral, 3 times daily PRN    meloxicam (MOBIC) 15 mg, oral, Daily    multivitamin tablet 1 tablet, oral, Daily    SUMAtriptan (Imitrex) 100 mg tablet Take 1 tablet at the first " sign of headache , take another one in 2 hrs if no improvement in headache . Maximum 2 tablets per day    topiramate (TOPAMAX) 50 mg, oral, 2 times daily    traMADol (Ultram) 50 mg tablet TAKE 1 TABLET (50 MG) BY MOUTH EVERY 6 HOURS IF NEEDED FOR SEVERE PAIN (7 - 10) FOR UP TO 7 DAYS.    valACYclovir (VALTREX) 1,000 mg, oral, Every 12 hours    venlafaxine XR (EFFEXOR-XR) 37.5 mg, oral, Daily, Take along with 75mg    venlafaxine XR (EFFEXOR-XR) 75 mg, oral, Daily      Social History     Tobacco Use    Smoking status: Every Day     Current packs/day: 0.25     Types: Cigarettes    Smokeless tobacco: Never    Tobacco comments:     9/26/24 ;1/2ppd   Substance Use Topics    Alcohol use: Not Currently        Review of Systems    Constitutional : No feeling poorly / fevers/ chills / night sweats/ fatigue   Cardiovascular : No CP /Palpitations/ lower extremity edema / syncope   Respiratory : No Cough /MONTIEL/Dyspnea at rest   Gastrointestinal : No abd pain / N/V  No bloody stools/ melena / constipation  Endo : No polyuria/polydipsia/ muscle weakness / sluggishness   CNS: No confusion / HA/ tingling/ numbness/ weakness of extremities  Psychiatric: No anxiety/ depression/ SI/HI    All other systems have been reviewed and are negative for complaint       Physical Exam    Constitutional : Vitals reviewed. Alert and in no distress  Cardiovascular : RRR, Normal S1, S2, No pericardial rub/ gallop, no peripheral edema   Pulmonary: No respiratory distress, CTAB   MSK : Normal gait and station , strength and tone   Skin: Warm to touch ,  normal skin turgor   Neurologic : CNs 2-12 grossly intact , no obvious FNDs  Psych : A,Ox3, normal mood and affect      Assessment/Plan   Diagnoses and all orders for this visit:  Pulmonary nodules  -     CT chest wo IV contrast; Future  Need for influenza vaccination  -     Flu vaccine, trivalent, preservative free, age 6 months and greater (Fluraix/Fluzone/Flulaval)  Primary hypertension  -      hydroCHLOROthiazide (HYDRODiuril) 25 mg tablet; Take 1 tablet (25 mg) by mouth once daily.  -     fluticasone propion-salmeteroL (Advair Diskus) 250-50 mcg/dose diskus inhaler; Inhale 1 puff every 12 hours. Rinse mouth after each use  -     CBC; Future  -     Comprehensive Metabolic Panel; Future  -     Hemoglobin A1C; Future  -     Lipid Panel; Future  -     TSH with reflex to Free T4 if abnormal; Future  Moderate persistent asthma without complication (HHS-HCC)  -     albuterol (ProAir HFA) 90 mcg/actuation inhaler; Inhale 2 puffs every 4 hours if needed for shortness of breath.  Moderate episode of recurrent major depressive disorder (Multi)  -     venlafaxine XR (Effexor-XR) 37.5 mg 24 hr capsule; Take 1 capsule (37.5 mg) by mouth once daily. Take along with 75mg  -     venlafaxine XR (Effexor-XR) 75 mg 24 hr capsule; Take 1 capsule (75 mg) by mouth once daily.  Depression with anxiety  -     venlafaxine XR (Effexor-XR) 37.5 mg 24 hr capsule; Take 1 capsule (37.5 mg) by mouth once daily. Take along with 75mg  -     venlafaxine XR (Effexor-XR) 75 mg 24 hr capsule; Take 1 capsule (75 mg) by mouth once daily.  Fibromyalgia  -     gabapentin (Neurontin) 300 mg capsule; Take 2 capsules (600 mg) by mouth 3 times a day.  -     topiramate (Topamax) 50 mg tablet; Take 1 tablet (50 mg) by mouth 2 times a day.  Cigarette nicotine dependence without complication  -     fluticasone propion-salmeteroL (Advair Diskus) 250-50 mcg/dose diskus inhaler; Inhale 1 puff every 12 hours. Rinse mouth after each use  Simple chronic bronchitis (Multi)  -     fluticasone propion-salmeteroL (Advair Diskus) 250-50 mcg/dose diskus inhaler; Inhale 1 puff every 12 hours. Rinse mouth after each use  Osteoarthritis of both knees, unspecified osteoarthritis type  -     meloxicam (Mobic) 15 mg tablet; Take 1 tablet (15 mg) by mouth once daily.  Degenerative joint disease of sacroiliac joint (CMS-HCC)  -     meloxicam (Mobic) 15 mg tablet; Take 1  tablet (15 mg) by mouth once daily.  Osteoarthritis of spine with radiculopathy, cervical region  -     meloxicam (Mobic) 15 mg tablet; Take 1 tablet (15 mg) by mouth once daily.  S/P cervical spinal fusion  -     meloxicam (Mobic) 15 mg tablet; Take 1 tablet (15 mg) by mouth once daily.  Vitamin D insufficiency  -     Vitamin D 25-Hydroxy,Total (for eval of Vitamin D levels); Future        55 y/o female with asthma, current every day smoker, HTN, migraine HA ,  h/o  intractable cervical radiculopathy secondary to cervical stenosis s/p C5-7 ACDF ( April 2022), mild DJD of SI joints , DJD of knee joints ( managed at [facility] , no recent Xrays to review ) and hips        Chronic medical conditions: Reviewed , assessed , stable, meds refilled.     - HTN   - migraine HA  - Asthma / COPD   Smoking cessation readdressed   - chronic pain from DJD   Has not filled tramadol since Jan, discussed not resuming , pt agreeable   Has been taking Meloxicam one everyday , advised to take with food   - surveillance mamm scheduled for Dec   - Pulm nodule surveillance: Ct chest ordedered     rTO in jan for Cpe, labs to be done prior           Conditions addressed and mgmt as noted above.  Pertinent labs, images/ imaging reports , chart review was done .   Age appropriate labs / labs for mgmt of chronic medical conditions ordered, further mgmt pending the results.       This note is intended for the physician writing it, as well as to communicate findings to other healthcare professionals. These notes use medical lexicon that may be misunderstood by non medical persons. Therefore, interpretations of medical notes and terminology should be approached with caution.

## 2024-09-28 DIAGNOSIS — G43.E09 CHRONIC MIGRAINE WITH AURA WITHOUT STATUS MIGRAINOSUS, NOT INTRACTABLE: ICD-10-CM

## 2024-10-01 RX ORDER — SUMATRIPTAN SUCCINATE 100 MG/1
TABLET ORAL
Qty: 9 TABLET | Refills: 2 | Status: SHIPPED | OUTPATIENT
Start: 2024-10-01

## 2024-10-08 ENCOUNTER — OFFICE VISIT (OUTPATIENT)
Dept: ORTHOPEDIC SURGERY | Facility: CLINIC | Age: 54
End: 2024-10-08
Payer: COMMERCIAL

## 2024-10-08 ENCOUNTER — APPOINTMENT (OUTPATIENT)
Dept: RADIOLOGY | Facility: CLINIC | Age: 54
End: 2024-10-08
Payer: COMMERCIAL

## 2024-10-08 ENCOUNTER — HOSPITAL ENCOUNTER (OUTPATIENT)
Dept: RADIOLOGY | Facility: HOSPITAL | Age: 54
Discharge: HOME | End: 2024-10-08
Payer: COMMERCIAL

## 2024-10-08 DIAGNOSIS — G56.02 CARPAL TUNNEL SYNDROME OF LEFT WRIST: Primary | ICD-10-CM

## 2024-10-08 DIAGNOSIS — R91.8 PULMONARY NODULES: ICD-10-CM

## 2024-10-08 PROCEDURE — 20526 THER INJECTION CARP TUNNEL: CPT | Mod: LT | Performed by: ORTHOPAEDIC SURGERY

## 2024-10-08 PROCEDURE — 71250 CT THORAX DX C-: CPT | Performed by: RADIOLOGY

## 2024-10-08 PROCEDURE — 71250 CT THORAX DX C-: CPT

## 2024-10-08 PROCEDURE — 2500000004 HC RX 250 GENERAL PHARMACY W/ HCPCS (ALT 636 FOR OP/ED): Performed by: ORTHOPAEDIC SURGERY

## 2024-10-08 PROCEDURE — 99213 OFFICE O/P EST LOW 20 MIN: CPT | Performed by: ORTHOPAEDIC SURGERY

## 2024-10-08 PROCEDURE — 4004F PT TOBACCO SCREEN RCVD TLK: CPT | Performed by: ORTHOPAEDIC SURGERY

## 2024-10-08 RX ORDER — TRIAMCINOLONE ACETONIDE 40 MG/ML
30 INJECTION, SUSPENSION INTRA-ARTICULAR; INTRAMUSCULAR
Status: COMPLETED | OUTPATIENT
Start: 2024-10-08 | End: 2024-10-08

## 2024-10-08 RX ORDER — LIDOCAINE HYDROCHLORIDE 10 MG/ML
0.75 INJECTION, SOLUTION INFILTRATION; PERINEURAL
Status: COMPLETED | OUTPATIENT
Start: 2024-10-08 | End: 2024-10-08

## 2024-10-08 NOTE — LETTER
October 8, 2024     Clara Wharton MD  [address]  [address]  [facility] [address]    Patient: Iwona Downey   YOB: 1970   Date of Visit: 10/8/2024       Dear Dr. Clara Wharton MD:    Thank you for referring Iwona Downey to me for evaluation. Below are my notes for this consultation.  If you have questions, please do not hesitate to call me. I look forward to following your patient along with you.       Sincerely,     Chris Ovalles MD      CC: No Recipients  ______________________________________________________________________________________    CHIEF COMPLAINT         Left hand pain    ASSESSMENT + PLAN    Left carpal tunnel syndrome, recurrent after remote cortisone injection    The nature of carpal tunnel syndrome was reviewed, along with the natural history.  I reviewed the options for management, including observation, nonsteroidals, splinting, oral steroids, cortisone injection, or surgical release, along with the likely success rates of each.  I reviewed the risks of cortisone injection, including infection, hypopigmentation, and fat atrophy.  The transient effect on blood glucose measurement was also reviewed, and the patient wished to proceed.    After sterile prep, I injected 30 mg of Kenalog and 1 cc of 1% lidocaine, plain to the left carpal tunnel.    Patient will followup in 4 weeks if still symptomatic, or with any concerns, and will continue with the brace and nonsteroidals as needed.        HISTORY OF PRESENT ILLNESS       Patient returns today, 7 months after last visit with me with recurrent numbness and tingling into the median distribution of the left hand, sparing the thumb.  This is bothersome overnight and she does wake with a positive shake sign.  Symptoms are more mild during the day.  No noted weakness.  Not dropping objects.  She does have a cold sensation in the hand although when she places the hand against her face it is not actually cold.  No  similar problems on the right.  She is here requesting another cortisone injection as she had good relief with the last one.      PHYSICAL EXAM       She remains well-developed, well-nourished obese female in no acute distress.  She appears her stated age and has a pleasant affect.  Skin of the left hand and wrist is intact with no erythema, ecchymosis, or diffuse swelling.  No cortisone stigmata.  Normal skin temperature, coloration, and drag.  5/5 APB and hand intrinsics with no wasting.  Full composite finger flexion extension with good intrinsic plus minus posture.  Normal cascade.  Positive Phalen but negative Durkan and Tinel at wrist and elbow.  Negative elbow flexion test.  Cervical range of motion is limited in both rotation and lateral bend but does not reproduce any discomfort below either shoulder.  Sensation intact to light touch in all distributions.  Capillary refill less than 2 seconds.  2+ radial and ulnar pulses.      IMAGING / LABS / EMGs    None today      PROCEDURE    Hand / UE Inj/Asp: L carpal tunnel for carpal tunnel syndrome on 10/8/2024 2:05 PM  Indications: therapeutic and diagnostic  Details: 25 G needle, volar approach  Medications: 30 mg triamcinolone acetonide 40 mg/mL; 0.75 mL lidocaine 10 mg/mL (1 %)  Outcome: tolerated well, no immediate complications  Procedure, treatment alternatives, risks and benefits explained, specific risks discussed. Consent was given by the patient.             Electronically Signed      DARLENE Ovalles MD      Orthopaedic Hand Surgery      [phone]

## 2024-10-08 NOTE — PROGRESS NOTES
CHIEF COMPLAINT         Left hand pain    ASSESSMENT + PLAN    Left carpal tunnel syndrome, recurrent after remote cortisone injection    The nature of carpal tunnel syndrome was reviewed, along with the natural history.  I reviewed the options for management, including observation, nonsteroidals, splinting, oral steroids, cortisone injection, or surgical release, along with the likely success rates of each.  I reviewed the risks of cortisone injection, including infection, hypopigmentation, and fat atrophy.  The transient effect on blood glucose measurement was also reviewed, and the patient wished to proceed.    After sterile prep, I injected 30 mg of Kenalog and 1 cc of 1% lidocaine, plain to the left carpal tunnel.    Patient will followup in 4 weeks if still symptomatic, or with any concerns, and will continue with the brace and nonsteroidals as needed.        HISTORY OF PRESENT ILLNESS       Patient returns today, 7 months after last visit with me with recurrent numbness and tingling into the median distribution of the left hand, sparing the thumb.  This is bothersome overnight and she does wake with a positive shake sign.  Symptoms are more mild during the day.  No noted weakness.  Not dropping objects.  She does have a cold sensation in the hand although when she places the hand against her face it is not actually cold.  No similar problems on the right.  She is here requesting another cortisone injection as she had good relief with the last one.      PHYSICAL EXAM       She remains well-developed, well-nourished obese female in no acute distress.  She appears her stated age and has a pleasant affect.  Skin of the left hand and wrist is intact with no erythema, ecchymosis, or diffuse swelling.  No cortisone stigmata.  Normal skin temperature, coloration, and drag.  5/5 APB and hand intrinsics with no wasting.  Full composite finger flexion extension with good intrinsic plus minus posture.  Normal cascade.   Positive Phalen but negative Durkan and Tinel at wrist and elbow.  Negative elbow flexion test.  Cervical range of motion is limited in both rotation and lateral bend but does not reproduce any discomfort below either shoulder.  Sensation intact to light touch in all distributions.  Capillary refill less than 2 seconds.  2+ radial and ulnar pulses.      IMAGING / LABS / EMGs    None today      PROCEDURE    Hand / UE Inj/Asp: L carpal tunnel for carpal tunnel syndrome on 10/8/2024 2:05 PM  Indications: therapeutic and diagnostic  Details: 25 G needle, volar approach  Medications: 30 mg triamcinolone acetonide 40 mg/mL; 0.75 mL lidocaine 10 mg/mL (1 %)  Outcome: tolerated well, no immediate complications  Procedure, treatment alternatives, risks and benefits explained, specific risks discussed. Consent was given by the patient.             Electronically Signed      DARLENE Ovalles MD      Orthopaedic Hand Surgery      [phone]

## 2024-10-31 ENCOUNTER — APPOINTMENT (OUTPATIENT)
Dept: DERMATOLOGY | Facility: CLINIC | Age: 54
End: 2024-10-31
Payer: COMMERCIAL

## 2024-11-05 ENCOUNTER — APPOINTMENT (OUTPATIENT)
Dept: ORTHOPEDIC SURGERY | Facility: CLINIC | Age: 54
End: 2024-11-05
Payer: COMMERCIAL

## 2024-11-27 DIAGNOSIS — R09.81 CHRONIC NASAL CONGESTION: ICD-10-CM

## 2024-11-27 RX ORDER — FLUTICASONE PROPIONATE 50 MCG
1-2 SPRAY, SUSPENSION (ML) NASAL DAILY
Qty: 48 ML | Refills: 2 | Status: SHIPPED | OUTPATIENT
Start: 2024-11-27

## 2024-12-23 ENCOUNTER — APPOINTMENT (OUTPATIENT)
Dept: RADIOLOGY | Facility: CLINIC | Age: 54
End: 2024-12-23
Payer: COMMERCIAL

## 2024-12-23 ENCOUNTER — OFFICE VISIT (OUTPATIENT)
Dept: ORTHOPEDIC SURGERY | Facility: CLINIC | Age: 54
End: 2024-12-23
Payer: COMMERCIAL

## 2024-12-23 ENCOUNTER — APPOINTMENT (OUTPATIENT)
Dept: ORTHOPEDIC SURGERY | Facility: CLINIC | Age: 54
End: 2024-12-23
Payer: COMMERCIAL

## 2024-12-23 DIAGNOSIS — G56.02 CARPAL TUNNEL SYNDROME OF LEFT WRIST: ICD-10-CM

## 2024-12-23 DIAGNOSIS — Z98.1 HISTORY OF FUSION OF CERVICAL SPINE: Primary | ICD-10-CM

## 2024-12-23 DIAGNOSIS — Z12.31 SCREENING MAMMOGRAM FOR BREAST CANCER: ICD-10-CM

## 2024-12-23 DIAGNOSIS — M75.82 ROTATOR CUFF TENDINITIS, LEFT: ICD-10-CM

## 2024-12-23 DIAGNOSIS — M54.12 CERVICAL RADICULOPATHY: ICD-10-CM

## 2024-12-23 DIAGNOSIS — M62.838 TRAPEZIUS MUSCLE SPASM: ICD-10-CM

## 2024-12-23 PROCEDURE — 99214 OFFICE O/P EST MOD 30 MIN: CPT

## 2024-12-23 RX ORDER — PREDNISONE 20 MG/1
TABLET ORAL
Qty: 30 TABLET | Refills: 0 | Status: SHIPPED | OUTPATIENT
Start: 2024-12-23 | End: 2025-01-07

## 2024-12-23 RX ORDER — KETOTIFEN FUMARATE 0.35 MG/ML
SOLUTION/ DROPS OPHTHALMIC
COMMUNITY
Start: 2024-07-15

## 2024-12-23 RX ORDER — TRIAMCINOLONE ACETONIDE 1 MG/G
OINTMENT TOPICAL
COMMUNITY

## 2024-12-23 ASSESSMENT — PAIN DESCRIPTION - DESCRIPTORS: DESCRIPTORS: BURNING

## 2024-12-23 ASSESSMENT — PAIN - FUNCTIONAL ASSESSMENT: PAIN_FUNCTIONAL_ASSESSMENT: 0-10

## 2024-12-23 ASSESSMENT — PAIN SCALES - GENERAL: PAINLEVEL_OUTOF10: 5 - MODERATE PAIN

## 2024-12-23 NOTE — PROGRESS NOTES
HPI:  Iwona Downey is a 54-year-old female who presents today with a 1 month history of left-sided neck and shoulder pain/numbness and tingling.    Most recent office visit with me on 9/17/2024 for left hand numbness and tingling in the second, third, fourth digits.  She subsequently saw Dr. Ovalles on 10/8/2024 for an injection for carpal tunnel which resolved her symptoms.    She recently started a new job as a  and started having her left-sided cervical pain and left shoulder pain.  She has numbness and tingling over her trapezius which is relatively constant when wearing a shirt.  She has left shoulder pain with vacuuming, sweeping, mopping movements.  And travels from the trapezius to the lateral aspect of the shoulder.  She does have some muscle spasms on the left side of her neck.  Very occasional right sided neck symptoms.  She denies any balance and coordination issues.  No issues with dexterity.  She does take tizanidine which helps with the spasms.  She is currently taking gabapentin 600 mg twice a day.  No other questions or concerns at time of visit.    ROS:  Reviewed on EMR and patient intake sheet.    PMH/SH:  Reviewed on EMR and patient intake sheet.    Exam:  MSK: Full strength and range of motion of right upper extremity with the exception of passive flexion to about 140 degrees.  Examination of the left arm and shoulder reveals positive Neer's test, positive Delatorre test, negative crossarm test.  Pain to passive flexion and abduction at 90 degrees.  Negative Spurling's, negative Mansi's.  Positive tenderness palpation of the posterior shoulder and trapezius.    General: No acute distress. Awake and conversant.  Eyes: Normal conjunctiva, anicteric. Round symmetric pupils.  ENT: Hearing grossly intact. No nasal discharge.  Neck: Neck is supple. No masses or thyromegaly.  Respiratory: Respirations are non-labored. No wheezing.  Skin: Warm. No rashes or ulcers.  Psych: Alert and  oriented. Cooperative, appropriate mood and affect, normal judgement.  CV: No lower extremity edema.  Neuro: Sensation and CN II-XII grossly normal.    Radiology:     No new images reviewed today, previous cervical x-rays from 9/13/2024 demonstrate previous C5-C7 fusion with hardware intact.  Adjacent level disc degeneration at C4/5.  No instability of cervical spine.    Diagnosis:    Degenerative disc disease, cervical  Cervical radiculopathy  History of cervical fusion  Carpal tunnel, left  Rotator cuff tendinopathy, left    Assessment and Plan:  Patient was seen today and evaluated for what I believe is signs and symptoms of both cervical radiculopathy and left shoulder rotator cuff tendinopathy.    At this time, I recommended physical therapy for both her cervical spine and left shoulder, a prednisone taper for her radicular symptoms, and a referral to a shoulder provider for a more in-depth evaluation of her left shoulder.  It is likely she is getting both radicular pain and mechanical pain on the left side.  As her symptoms are worse with activity, combined with her pain to movement and shoulder special tests, she may benefit from a left shoulder injection.  The referral for the shoulder provider was placed today.    I will see her for follow-up after completion of physical therapy and once her prednisone taper is finished, and if her symptoms are still intolerable, an MRI of the cervical spine would be appropriate.    Patient feels her questions were answered today.  Patient agrees with plan above.    **This note was dictated using speech recognition software and was not corrected for spelling or grammatical errors**    Ryan Ramirez PA-C  Department of Orthopaedic Surgery  11:03 AM  12/23/24    [address] [place] [address]    Voicemail: [phone]   Appts: [phone]  Fax: [phone]

## 2024-12-26 ENCOUNTER — APPOINTMENT (OUTPATIENT)
Dept: RADIOLOGY | Facility: CLINIC | Age: 54
End: 2024-12-26
Payer: COMMERCIAL

## 2024-12-31 DIAGNOSIS — G43.E09 CHRONIC MIGRAINE WITH AURA WITHOUT STATUS MIGRAINOSUS, NOT INTRACTABLE: ICD-10-CM

## 2025-01-01 RX ORDER — SUMATRIPTAN SUCCINATE 100 MG/1
TABLET ORAL
Qty: 9 TABLET | Refills: 2 | Status: SHIPPED | OUTPATIENT
Start: 2025-01-01

## 2025-01-02 DIAGNOSIS — I10 PRIMARY HYPERTENSION: ICD-10-CM

## 2025-01-07 RX ORDER — HYDROCHLOROTHIAZIDE 25 MG/1
25 TABLET ORAL DAILY
Qty: 90 TABLET | Refills: 1 | Status: SHIPPED | OUTPATIENT
Start: 2025-01-07

## 2025-01-09 ENCOUNTER — APPOINTMENT (OUTPATIENT)
Dept: RADIOLOGY | Facility: HOSPITAL | Age: 55
End: 2025-01-09
Payer: COMMERCIAL

## 2025-01-10 ENCOUNTER — APPOINTMENT (OUTPATIENT)
Dept: RADIOLOGY | Facility: HOSPITAL | Age: 55
End: 2025-01-10
Payer: COMMERCIAL

## 2025-01-10 ENCOUNTER — APPOINTMENT (OUTPATIENT)
Dept: ORTHOPEDIC SURGERY | Facility: HOSPITAL | Age: 55
End: 2025-01-10
Payer: COMMERCIAL

## 2025-01-10 DIAGNOSIS — M25.512 LEFT SHOULDER PAIN, UNSPECIFIED CHRONICITY: Primary | ICD-10-CM

## 2025-01-12 DIAGNOSIS — M79.7 FIBROMYALGIA: ICD-10-CM

## 2025-01-12 DIAGNOSIS — M54.12 CERVICAL RADICULOPATHY: ICD-10-CM

## 2025-01-12 DIAGNOSIS — M75.82 ROTATOR CUFF TENDINITIS, LEFT: ICD-10-CM

## 2025-01-15 RX ORDER — PREDNISONE 20 MG/1
TABLET ORAL
Qty: 30 TABLET | Refills: 0 | Status: SHIPPED | OUTPATIENT
Start: 2025-01-15 | End: 2025-01-29

## 2025-01-17 RX ORDER — GABAPENTIN 300 MG/1
600 CAPSULE ORAL 3 TIMES DAILY
Qty: 180 CAPSULE | Refills: 3 | Status: SHIPPED | OUTPATIENT
Start: 2025-01-17

## 2025-01-24 ENCOUNTER — HOSPITAL ENCOUNTER (OUTPATIENT)
Dept: RADIOLOGY | Facility: HOSPITAL | Age: 55
Discharge: HOME | End: 2025-01-24
Payer: COMMERCIAL

## 2025-01-24 DIAGNOSIS — M25.512 LEFT SHOULDER PAIN, UNSPECIFIED CHRONICITY: ICD-10-CM

## 2025-01-24 PROCEDURE — 73030 X-RAY EXAM OF SHOULDER: CPT | Mod: LT

## 2025-01-26 ASSESSMENT — PROMIS GLOBAL HEALTH SCALE
RATE_QUALITY_OF_LIFE: GOOD
RATE_MENTAL_HEALTH: FAIR
RATE_PHYSICAL_HEALTH: GOOD
RATE_AVERAGE_PAIN: 10
RATE_GENERAL_HEALTH: GOOD
EMOTIONAL_PROBLEMS: OFTEN
CARRYOUT_SOCIAL_ACTIVITIES: VERY GOOD
RATE_AVERAGE_FATIGUE: VERY SEVERE
RATE_SOCIAL_SATISFACTION: GOOD
CARRYOUT_PHYSICAL_ACTIVITIES: MODERATELY

## 2025-01-27 ENCOUNTER — APPOINTMENT (OUTPATIENT)
Dept: PRIMARY CARE | Facility: CLINIC | Age: 55
End: 2025-01-27
Payer: COMMERCIAL

## 2025-01-31 ENCOUNTER — OFFICE VISIT (OUTPATIENT)
Dept: ORTHOPEDIC SURGERY | Facility: HOSPITAL | Age: 55
End: 2025-01-31
Payer: COMMERCIAL

## 2025-01-31 VITALS — BODY MASS INDEX: 38.65 KG/M2 | WEIGHT: 232 LBS | HEIGHT: 65 IN

## 2025-01-31 DIAGNOSIS — M75.82 ROTATOR CUFF TENDINITIS, LEFT: Primary | ICD-10-CM

## 2025-01-31 PROCEDURE — 20610 DRAIN/INJ JOINT/BURSA W/O US: CPT | Mod: LT | Performed by: NURSE PRACTITIONER

## 2025-01-31 PROCEDURE — 99213 OFFICE O/P EST LOW 20 MIN: CPT | Performed by: NURSE PRACTITIONER

## 2025-01-31 PROCEDURE — 2500000004 HC RX 250 GENERAL PHARMACY W/ HCPCS (ALT 636 FOR OP/ED): Performed by: NURSE PRACTITIONER

## 2025-01-31 PROCEDURE — 3008F BODY MASS INDEX DOCD: CPT | Performed by: NURSE PRACTITIONER

## 2025-01-31 PROCEDURE — 99213 OFFICE O/P EST LOW 20 MIN: CPT | Mod: 25 | Performed by: NURSE PRACTITIONER

## 2025-01-31 RX ADMIN — LIDOCAINE HYDROCHLORIDE 4 ML: 10 INJECTION, SOLUTION INFILTRATION; PERINEURAL at 23:49

## 2025-01-31 RX ADMIN — TRIAMCINOLONE ACETONIDE 40 MG: 200 INJECTION, SUSPENSION INTRA-ARTICULAR; INTRAMUSCULAR at 23:49

## 2025-01-31 ASSESSMENT — PAIN SCALES - GENERAL: PAINLEVEL_OUTOF10: 10 - WORST POSSIBLE PAIN

## 2025-01-31 ASSESSMENT — PAIN - FUNCTIONAL ASSESSMENT: PAIN_FUNCTIONAL_ASSESSMENT: 0-10

## 2025-01-31 NOTE — PROGRESS NOTES
L Inj/Asp: L subacromial bursa on 1/31/2025 11:49 PM  Indications: pain  Details: 21 G needle, posterior approach  Medications: 40 mg triamcinolone acetonide 40 mg/mL; 4 mL lidocaine 10 mg/mL (1 %)  Procedure, treatment alternatives, risks and benefits explained, specific risks discussed. Consent was given by the patient. Immediately prior to procedure a time out was called to verify the correct patient, procedure, equipment, support staff and site/side marked as required. Patient was prepped and draped in the usual sterile fashion.       Provider Impression/Assessment:  Iwona Downey is a 54 y.o. female with left shoulder pain consistent with likely rotator cuff tendinitis and biceps tendinitis. She has undergone cervical fusion with Dr Cosby.     Patient Discussion/Plan:  At this time I had a very long discussion with the patient regarding the diagnosis. Rotator cuff disease is a spectrum of disorders ranging from rotator cuff tendinitis to full-thickness rotator cuff tears. We know that some patients over the age of 50 will have symptomatic rotator cuff tears due to overuse and general wear and tear. In general most patients who come in with complaints such as these will get better with home therapy, NSAIDs and injections alone. The therapy should be performed 2-3 times a day and should take about 10-15 minutes to perform each time.     We have given the patient our written provider directed home exercise program, along with correlating website for home therapy. She is already scheduled to start formal physical therapy in the next few weeks. She is encouraged to start home exercises now for her shoulder. She can also take NSAIDs as needed for pain. Be sure to take medication with food.     They tolerated the injection well today for their LEFT shoulder. We will see how they do after conservative management of therapy, OTC medications and injections. If they do not get sustained relief, consideration for a  repeat injection versus advanced imaging can be made. We have filled out her work paper today with restrictions for her left arm due to the nature of her position. She should not lift more than 10 pounds at waist level and no repetitive push/pull with her left shoulder.     They have been scheduled to return in 8 weeks for a repeat clinical check. No repeat imaging needed at that time. If symptoms improve, they can see us back on an as-needed basis. All patient's questions were answered to their satisfaction today and they are comfortable with the above plan.     Should you have any questions or concerns after your visit today, please do not hesitate to call the office at [phone]. We strive to give you high-quality, patient-centered, collaborative care and I am honored to be a part of your health care team.    ----------------------------------------------------------------------------------------------------------  CHIEF COMPLAINT:  NPV LEFT SHOULDER    History of Present Illness:  Iwona Downey is a pleasant 54 y.o. right hand dominant female with a history of fibromyalgia and cervical spine fusion. She presents today with left shoulder pain. She was referred to our clinic after being seen in the [facility] on 12/23/24 by Ryan Ramirez PA-C. Left shoulder XR completed 12/23/24, cervical spine XR completed 9/17/24. She is a  with the [employer]. She was a  prior to that for many years. She has a long history of left shoulder pain that has worsened since beginning her new role as . Iwona states that she has shooting pain and skin sensitivity in the posterior shoulder. She has been prescribed Prednisone in the past that has provided some pain relief. She will begin formal physical therapy in the next few weeks for her neck and shoulder pain. Iwona has been seen by Dr. Ovalles for left carpal tunnel injections and had a cervical spine surgery with Dr. Cosby in the  past. She reports that she had neck and shoulder pain prior to her cervical spine surgery. The numbness she was experiencing is gone in her upper extremity since cervical fusion. Denies any numbness or tingling today.     They have not tried any therapy. She has not had any previous shoulder injections. The shoulder has not really gotten any better. 75% of a normal shoulder.  4 out of 10 pain on average. 9 out of 10 at their worst. Does wake them from sleep. Does not feel unstable. Does not feel stiff. No numbness or tingling. Taking over-the counter medications for pain relief. The opposite shoulder is normal, without complaint. Denies fevers or chills.     Smoking Status: Non-smoker  Diabetic: Denies  BMI: 38  NKDA    Past medical history, surgical history, social history and family history were all reviewed and are per the patient's health history questionnaire. Family history is not pertinent to the presenting problem.     Allergies:  No Known Allergies    Past Medical History:   Diagnosis Date    Asthma     Fibromyalgia     HTN (hypertension)     Migraines     VEGA (obstructive sleep apnea)        Past Surgical History:   Procedure Laterality Date    ANTERIOR CERVICAL DISCECTOMY W/ FUSION  04/11/2022    C5-7    COLONOSCOPY      KNEE ARTHROSCOPY W/ DEBRIDEMENT Bilateral     LAPAROSCOPIC HYSTERECTOMY      SKIN GRAFT Right     age 2, right chest       Social History     Socioeconomic History    Marital status: Single     Spouse name: Not on file    Number of children: Not on file    Years of education: Not on file    Highest education level: Not on file   Occupational History    Not on file   Tobacco Use    Smoking status: Every Day     Current packs/day: 0.25     Types: Cigarettes    Smokeless tobacco: Never    Tobacco comments:     9/26/24 ;1/2ppd   Vaping Use    Vaping status: Never Used   Substance and Sexual Activity    Alcohol use: Not Currently    Drug use: Not Currently    Sexual activity: Not on file    Other Topics Concern    Not on file   Social History Narrative    Not on file     Social Drivers of Health     Financial Resource Strain: Not on file   Food Insecurity: Not on file   Transportation Needs: Not on file   Physical Activity: Not on file   Stress: Not on file   Social Connections: Not on file   Intimate Partner Violence: Not on file   Housing Stability: Not on file       Medications:    Current Outpatient Medications:     albuterol (ProAir HFA) 90 mcg/actuation inhaler, Inhale 2 puffs every 4 hours if needed for shortness of breath., Disp: 18 g, Rfl: 3    atorvastatin (Lipitor) 10 mg tablet, Take 1 tablet (10 mg) by mouth once daily at bedtime., Disp: 90 tablet, Rfl: 3    benzoyl peroxide 5 % external wash, WASH AFFECTED AREAS ONCE DAILY IN THE MORNING, Disp: 142 g, Rfl: 0    cholecalciferol (Vitamin D-3) 50 mcg (2,000 unit) capsule, Take by mouth., Disp: , Rfl:     clindamycin (Cleocin T) 1 % lotion, APPLY TOPICALLY TO AFFECTED AREA TWICE A DAY OR UP TO 3-4 TIMES PER DAY AS NEEDED FOR ACTIVE LESIONS, Disp: 60 mL, Rfl: 0    cyanocobalamin (Vitamin B-12) 1,000 mcg tablet, Take 1 tablet (1,000 mcg) by mouth once daily., Disp: , Rfl:     fluticasone (Flonase) 50 mcg/actuation nasal spray, ADMINISTER 1-2 SPRAYS INTO EACH NOSTRIL ONCE DAILY., Disp: 48 mL, Rfl: 2    fluticasone propion-salmeteroL (Advair Diskus) 250-50 mcg/dose diskus inhaler, Inhale 1 puff every 12 hours. Rinse mouth after each use, Disp: 60 each, Rfl: 11    gabapentin (Neurontin) 300 mg capsule, TAKE 2 CAPSULES (600 MG) BY MOUTH 3 TIMES A DAY., Disp: 180 capsule, Rfl: 3    hydroCHLOROthiazide (HYDRODiuril) 25 mg tablet, TAKE 1 TABLET BY MOUTH EVERY DAY, Disp: 90 tablet, Rfl: 1    ketotifen (Zaditor) 0.025 % (0.035 %) ophthalmic solution, , Disp: , Rfl:     meclizine (Antivert) 25 mg tablet, Take by mouth 3 times a day as needed., Disp: , Rfl:     meloxicam (Mobic) 15 mg tablet, Take 1 tablet (15 mg) by mouth once daily., Disp: 90 tablet,  Rfl: 1    multivitamin tablet, Take 1 tablet by mouth once daily., Disp: , Rfl:     SUMAtriptan (Imitrex) 100 mg tablet, TAKE 1 TABLET AT THE FIRST SIGN OF HEADACHE , TAKE ANOTHER ONE IN 2 HRS IF NO IMPROVEMENT IN HEADACHE . MAXIMUM 2 TABLETS PER DAY, Disp: 9 tablet, Rfl: 2    topiramate (Topamax) 50 mg tablet, Take 1 tablet (50 mg) by mouth 2 times a day., Disp: 180 tablet, Rfl: 3    traMADol (Ultram) 50 mg tablet, TAKE 1 TABLET (50 MG) BY MOUTH EVERY 6 HOURS IF NEEDED FOR SEVERE PAIN (7 - 10) FOR UP TO 7 DAYS., Disp: , Rfl:     triamcinolone (Kenalog) 0.1 % ointment, , Disp: , Rfl:     valACYclovir (Valtrex) 1 gram tablet, Take 1 tablet (1,000 mg) by mouth every 12 hours., Disp: , Rfl:     venlafaxine XR (Effexor-XR) 37.5 mg 24 hr capsule, Take 1 capsule (37.5 mg) by mouth once daily. Take along with 75mg, Disp: 90 capsule, Rfl: 3    venlafaxine XR (Effexor-XR) 75 mg 24 hr capsule, Take 1 capsule (75 mg) by mouth once daily., Disp: 90 capsule, Rfl: 3    Family History:  Family History   Problem Relation Name Age of Onset    Lung cancer Mother      Pancreatic cancer Father      Breast cancer Mother's Sister      Breast cancer Maternal Grandmother      Pancreatic cancer Maternal Grandfather         Review of Systems:   Review of systems for all 14 systems is negative for complaint except for the above noted       findings in the history of present illness.    Diagnoses/Problems:  Left shoulder pain    Physical Examination:  Patient is a well-developed, well nourished female in no acute distress. Awake, alert and oriented x3, with appropriate mood. Breathes with normal chest rises. Eye exam reveals round pupils with clear sclera. Gait is steady.    Patient had full range of motion of bilateral elbows. 5 out of 5 bilateral wrist flexion and extension. Thumb extension positive bilaterally.    Examination of the right shoulder today reveals the skin to be intact. There is no sign any atrophy lesions or abrasions. There  is no pain to palpation of the bony prominences. Range of motion of the right shoulder revealed 0-170° of forward elevation. 0-60° of external rotation. Internal rotation was to T12. Provocative maneuvers today were negative.    Examination of the left shoulder reveals the skin to be intact. No sign any atrophy lesions or abrasions. There is mild tenderness with palpation along the anterior chromium and greater tuberosity. Moderate tenderness with palpation over parascapular region and trapezius tightness. Active range of motion is 150° of forward elevation, passively correctable to 160. 40° of external rotation. Internal rotation was to T12. Abduction to 140°. Bicipital groove POSITIVE. Speeds test POSITIVE  Jobes test POSITIVE, 4+ out of 5    Neers sign POSITIVE   Delatorre sign POSITIVE  External/Internal resistance NEGATIVE, 5/5 strenght    Results/Imaging:  Independent review of the imaging of left shoulder shows no signs of any fracture, dislocation or arthritis. I personally spent time viewing digital images of the radiology testing with the patient. I explained the results to the patient, along with suggested explanation for follow up recommendations based on testing and clinical results. No new images are attached to the encounter.     Procedure  Risks, benefits and alternatives of injection discussed with the patient prior to injection. After receiving verbal informed consent from the patient, I sterilely prepped the LEFT shoulder posteriorly and under sterile conditions injected 40mg of Kenalog and 4ml of 1% lidocaine into the posterior subacromial space. Injection site wiped with a sterile gauze after procedure and Band-Aid placed. The patient tolerated the procedure well and without complication. Injection under guidance of Dr Yuen. They can use ice on injection site if discomfort following injection today. Allow 24-48 hours for the injection to start to relieve pain and discomfort of the shoulder.  Limit overhead and lifting activities for 48 hours.    *While intending to generate a timely document that accurately reflects the content of the visit, no guarantee can be provided that every grammatical or spelling mistake has been or will be identified or corrected. Thank you for your understanding.

## 2025-02-02 RX ORDER — TRIAMCINOLONE ACETONIDE 40 MG/ML
40 INJECTION, SUSPENSION INTRA-ARTICULAR; INTRAMUSCULAR
Status: COMPLETED | OUTPATIENT
Start: 2025-01-31 | End: 2025-01-31

## 2025-02-02 RX ORDER — LIDOCAINE HYDROCHLORIDE 10 MG/ML
4 INJECTION, SOLUTION INFILTRATION; PERINEURAL
Status: COMPLETED | OUTPATIENT
Start: 2025-01-31 | End: 2025-01-31

## 2025-02-03 ENCOUNTER — HOSPITAL ENCOUNTER (OUTPATIENT)
Dept: RADIOLOGY | Facility: CLINIC | Age: 55
Discharge: HOME | End: 2025-02-03
Payer: COMMERCIAL

## 2025-02-03 VITALS — HEIGHT: 65 IN | BODY MASS INDEX: 38.57 KG/M2 | WEIGHT: 231.48 LBS

## 2025-02-03 DIAGNOSIS — Z12.31 SCREENING MAMMOGRAM FOR BREAST CANCER: ICD-10-CM

## 2025-02-03 PROCEDURE — 77063 BREAST TOMOSYNTHESIS BI: CPT

## 2025-02-03 PROCEDURE — 77063 BREAST TOMOSYNTHESIS BI: CPT | Performed by: STUDENT IN AN ORGANIZED HEALTH CARE EDUCATION/TRAINING PROGRAM

## 2025-02-03 PROCEDURE — 77067 SCR MAMMO BI INCL CAD: CPT | Performed by: STUDENT IN AN ORGANIZED HEALTH CARE EDUCATION/TRAINING PROGRAM

## 2025-02-06 ENCOUNTER — HOSPITAL ENCOUNTER (OUTPATIENT)
Dept: RADIOLOGY | Facility: CLINIC | Age: 55
Discharge: HOME | End: 2025-02-06
Payer: COMMERCIAL

## 2025-02-06 ENCOUNTER — OFFICE VISIT (OUTPATIENT)
Dept: ORTHOPEDIC SURGERY | Facility: CLINIC | Age: 55
End: 2025-02-06
Payer: COMMERCIAL

## 2025-02-06 DIAGNOSIS — M79.641 RIGHT HAND PAIN: ICD-10-CM

## 2025-02-06 PROCEDURE — 73130 X-RAY EXAM OF HAND: CPT | Mod: RT

## 2025-02-06 PROCEDURE — 99214 OFFICE O/P EST MOD 30 MIN: CPT | Performed by: ORTHOPAEDIC SURGERY

## 2025-02-06 PROCEDURE — 99204 OFFICE O/P NEW MOD 45 MIN: CPT | Performed by: ORTHOPAEDIC SURGERY

## 2025-02-06 RX ORDER — METHYLPREDNISOLONE 4 MG/1
TABLET ORAL
Qty: 21 TABLET | Refills: 0 | Status: SHIPPED | OUTPATIENT
Start: 2025-02-06

## 2025-02-06 NOTE — PROGRESS NOTES
Chief Complaint   Chief Complaint   Patient presents with    Right Hand - Pain         HPI:      Iwona Downey is a pleasant 54 y.o. year-old female who is seen today for 3 to 4-week history of pain in the palm of her hand.  Hurts to grasp objects not clear history of triggering although it is involving her ring long and index fingers no history of injury she gets injections in the left wrist for carpal tunnel syndrome    Review of Systems all other body systems have been reviewed and are negative for complaint.    There were no vitals filed for this visit.    Past Medical History:   Diagnosis Date    Anxiety 2023    Arthritis 2021    Asthma     Breast injury 1973    Depression 1995    Fibromyalgia     Glaucoma 2021    HTN (hypertension)     Migraines     VEGA (obstructive sleep apnea)      Patient Active Problem List   Diagnosis    Abnormal echocardiogram    Abnormal mammogram of left breast    Abnormal uterine bleeding (AUB)    Acquired plantar porokeratosis    Other acute sinusitis    Sinobronchitis    Ankle pain, left    Leg pain    Arthritis of knee    Arthritis of shoulder region, left    Arthropathy    Dermatitis    Eczema    Vaginal atrophy    Vaginitis    Asthmatic bronchitis ([facility]HS-HCC)    Bilateral hip joint arthritis    Bilateral knee pain    Blister of foot, initial encounter    BPPV (benign paroxysmal positional vertigo)    Bunion    Cardiomegaly    Carpal tunnel syndrome of left wrist    Right carpal tunnel syndrome    Cervical disc disease    Cervical kyphosis    Cervical spinal stenosis    Cervical sprain    Change in mental status    Abdominal pain    Chronic musculoskeletal pain    Fibromyalgia    Neck pain    Cold sore    Contusion of knee, left    Cough    Snoring    Cyst of right ovary    Depression with anxiety    Dermatitis, eyelid, herpes zoster    Diarrhea    Dizziness    Blurred vision, bilateral    Dry eye syndrome, bilateral    Eye discomfort    Infection of eye region    Facial cellulitis     Fatigue    Fibroid uterus    Forehead contusion    Fracture of fibula, distal, left, closed    Glaucoma suspect, both eyes    Goiter    High-tone pelvic floor dysfunction in female    Hypertension    Irritation of eyelid    Knee injury, initial encounter    Knee strain, left, initial encounter    Laceration of right middle finger without foreign body without damage to nail    Left cervical radiculopathy    Left corneal abrasion    Left shoulder pain    Lipoma of neck    Lipoma of scalp    Lumbar degenerative disc disease    Lung nodule    Medication overuse headache    Migraines    Nausea and vomiting    Morbid obesity (Multi)    Obesity    Obstructive sleep apnea    Pain of left hand    Pain of right hand    Thumb pain, right    Plantar wart of right foot    Preseptal cellulitis    Squamous blepharitis    Reactive lymphadenopathy    Bilateral sciatica    Sciatica of left side    Shortness of breath    Shoulder sprain    Shoulder tendinitis, left    Skin rash    Sore throat    Sprain of interphalangeal joint of right little finger    Status post cervical spinal fusion    Stiffness of finger joint of right hand    Suspected sleep apnea    Swelling of eyelid, right    Tobacco dependence    Trigger finger of right thumb    Upper respiratory infection    Vaginal discharge    Vaginal irritation    Vitamin D deficiency       Medication Documentation Review Audit       Reviewed by Mohit Artis MA (Medical Assistant) on 02/06/25 at 1001      Medication Order Taking? Sig Documenting Provider Last Dose Status   albuterol (ProAir HFA) 90 mcg/actuation inhaler [id number]  Inhale 2 puffs every 4 hours if needed for shortness of breath. Clara Wharton MD  Active   atorvastatin (Lipitor) 10 mg tablet [id number] No Take 1 tablet (10 mg) by mouth once daily at bedtime. Clara Wharton MD Taking Active   benzoyl peroxide 5 % external wash [id number] No WASH AFFECTED AREAS ONCE DAILY IN THE MORNING Wing Espino MD  Taking Active   cholecalciferol (Vitamin D-3) 50 mcg (2,000 unit) capsule [id number] No Take by mouth. Historical Provider, MD Taking Active   clindamycin (Cleocin T) 1 % lotion [id number] No APPLY TOPICALLY TO AFFECTED AREA TWICE A DAY OR UP TO 3-4 TIMES PER DAY AS NEEDED FOR ACTIVE LESIONS Wing Espino MD Taking Active   cyanocobalamin (Vitamin B-12) 1,000 mcg tablet [id number] No Take 1 tablet (1,000 mcg) by mouth once daily. Historical Provider, MD Taking Active   fluticasone (Flonase) 50 mcg/actuation nasal spray [id number]  ADMINISTER 1-2 SPRAYS INTO EACH NOSTRIL ONCE DAILY. Clara Wharton MD  Active   fluticasone propion-salmeteroL (Advair Diskus) 250-50 mcg/dose diskus inhaler [id number]  Inhale 1 puff every 12 hours. Rinse mouth after each use Clara Wharton MD  Active   gabapentin (Neurontin) 300 mg capsule [id number]  TAKE 2 CAPSULES (600 MG) BY MOUTH 3 TIMES A DAY. Clara Wharton MD  Active   hydroCHLOROthiazide (HYDRODiuril) 25 mg tablet [id number]  TAKE 1 TABLET BY MOUTH EVERY DAY Clara Wharton MD  Active   ketotifen (Zaditor) 0.025 % (0.035 %) ophthalmic solution [id number]   Historical Provider, MD  Active   meclizine (Antivert) 25 mg tablet [id number] No Take by mouth 3 times a day as needed. Historical Provider, MD Taking Active   meloxicam (Mobic) 15 mg tablet [id number]  Take 1 tablet (15 mg) by mouth once daily. Clara Wharton MD  Active   multivitamin tablet [id number] No Take 1 tablet by mouth once daily. Historical Provider, MD Taking Active   SUMAtriptan (Imitrex) 100 mg tablet [id number]  TAKE 1 TABLET AT THE FIRST SIGN OF HEADACHE , TAKE ANOTHER ONE IN 2 HRS IF NO IMPROVEMENT IN HEADACHE . MAXIMUM 2 TABLETS PER DAY Clara Wharton MD  Active   topiramate (Topamax) 50 mg tablet [id number]  Take 1 tablet (50 mg) by mouth 2 times a day. Clara Wharton MD  Active   traMADol (Ultram) 50 mg tablet [id number] No TAKE 1 TABLET (50 MG) BY MOUTH EVERY 6 HOURS  IF NEEDED FOR SEVERE PAIN (7 - 10) FOR UP TO 7 DAYS. Historical Provider, MD Taking Active   triamcinolone (Kenalog) 0.1 % ointment [id number]   Historical Provider, MD  Active   valACYclovir (Valtrex) 1 gram tablet [id number] No Take 1 tablet (1,000 mg) by mouth every 12 hours. Historical Provider, MD Taking Active   venlafaxine XR (Effexor-XR) 37.5 mg 24 hr capsule [id number]  Take 1 capsule (37.5 mg) by mouth once daily. Take along with 75mg Clara Wharton MD  Active   venlafaxine XR (Effexor-XR) 75 mg 24 hr capsule [id number]  Take 1 capsule (75 mg) by mouth once daily. Clara Wharton MD  Active                    No Known Allergies    Social History     Socioeconomic History    Marital status: Single     Spouse name: Not on file    Number of children: Not on file    Years of education: Not on file    Highest education level: Not on file   Occupational History    Not on file   Tobacco Use    Smoking status: Every Day     Current packs/day: 0.25     Types: Cigarettes    Smokeless tobacco: Never    Tobacco comments:     9/26/24 ;1/2ppd   Vaping Use    Vaping status: Never Used   Substance and Sexual Activity    Alcohol use: Not Currently    Drug use: Not Currently    Sexual activity: Not on file   Other Topics Concern    Not on file   Social History Narrative    Not on file     Social Drivers of Health     Financial Resource Strain: Not on file   Food Insecurity: Not on file   Transportation Needs: Not on file   Physical Activity: Not on file   Stress: Not on file   Social Connections: Not on file   Intimate Partner Violence: Not on file   Housing Stability: Not on file       Past Surgical History:   Procedure Laterality Date    ANTERIOR CERVICAL DISCECTOMY W/ FUSION  04/11/2022    C5-7    BREAST BIOPSY Right 2024    benign    COLONOSCOPY      KNEE ARTHROSCOPY W/ DEBRIDEMENT Bilateral     LAPAROSCOPIC HYSTERECTOMY      LYMPH NODE BIOPSY  2024    SKIN GRAFT Right     age 2, right chest       There is no  height or weight on file to calculate BMI.    HgA1c:   Lab Results   Component Value Date    HGBA1C 5.4 01/09/2024    [id number] 108 01/09/2024       Physical Exam:  Constitutional: Well-developed well-nourished   Eyes: Sclerae anicteric, pupils equal and round  HENT: Normocephalic atraumatic  Cardiovascular: Pulses full, regular rate and rhythm  Respiratory: Breathing not labored, no wheezing  Integumentary: Skin intact, no lesions or rashes  Neurological: Sensation intact, no gross strength deficits, reflexes equal  Psychiatric: Alert oriented and appropriate  Hematologic/lymphatic: No lymphadenopathy  Right hand difficult exam because she is so tender I do not feel any nodularity in the palm and do not feel any triggering or she is tender over the A1 pulleys there is no malalignment of the digits he is neurovascularly intact          Imaging:  X-ray of the hand reviewed and are negative        Impression/Plan:  Trigger fingers?  Try Medrol Dosepak if she is not, send 2 injections she is on meloxicam hopefully consider injections if this does not help

## 2025-02-07 ENCOUNTER — APPOINTMENT (OUTPATIENT)
Dept: PRIMARY CARE | Facility: CLINIC | Age: 55
End: 2025-02-07
Payer: COMMERCIAL
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
## 2025-02-19 ENCOUNTER — APPOINTMENT (OUTPATIENT)
Dept: SURGICAL ONCOLOGY | Facility: CLINIC | Age: 55
End: 2025-02-19
Payer: COMMERCIAL

## 2025-02-21 ENCOUNTER — EVALUATION (OUTPATIENT)
Dept: PHYSICAL THERAPY | Facility: CLINIC | Age: 55
End: 2025-02-21
Payer: COMMERCIAL

## 2025-02-21 DIAGNOSIS — M54.12 CERVICAL RADICULOPATHY: ICD-10-CM

## 2025-02-21 DIAGNOSIS — M75.82 ROTATOR CUFF TENDINITIS, LEFT: ICD-10-CM

## 2025-02-21 PROCEDURE — 97110 THERAPEUTIC EXERCISES: CPT | Mod: GP

## 2025-02-21 PROCEDURE — 97161 PT EVAL LOW COMPLEX 20 MIN: CPT | Mod: GP

## 2025-02-21 ASSESSMENT — ENCOUNTER SYMPTOMS
OCCASIONAL FEELINGS OF UNSTEADINESS: 0
DEPRESSION: 1

## 2025-02-21 ASSESSMENT — PAIN SCALES - GENERAL: PAINLEVEL_OUTOF10: 10 - WORST POSSIBLE PAIN

## 2025-02-21 NOTE — PROGRESS NOTES
Physical Therapy  Physical Therapy Orthopedic Evaluation    Patient Name: Iwona Downey  MRN: [id number]  Today's Date: 2/21/2025  Time Calculation  Start Time: 1405  Stop Time: 1449  Time Calculation (min): 44 min    Insurance:  Visit number: 1 of [place]  Authorization info: No Auth  Insurance Type: [employer]  Onset date:     General:  Reason for visit: Cervical Radiculopathy, L RTC Tendinitis  Referred by: Ryan Ramirez PA-C    Current Problem  1. Cervical radiculopathy  Referral to Physical Therapy    Follow Up In Physical Therapy      2. Rotator cuff tendinitis, left  Referral to Physical Therapy    Follow Up In Physical Therapy          Precautions:   Precautions  STEADI Fall Risk Score (The score of 4 or more indicates an increased risk of falling): 5    Medical History Form: Reviewed (scanned into chart)    Subjective:   Subjective   Chief Complaint: Patient presents to clinic with left sided neck and shoulder pain that begin in 2020.  Hx of cervical surgery (C5-C7 fusion) in 2022 for cervical radiculopathy which improved left sided radiculopathy. Pain had been on and off.   November of 2024, started having L shoulder pain.  2 months ago, switched longterm job so that she doesn't have to lift as much.  Still having some pain in the shoulder and burning pain in the lateral neck.  1/24/25, had an injection to the left shoulder, and it provided mild relief in the shoulder pain, but has continued to have burning neck pain and shooting pain to the left elbow region.    Onset Date: 11/1/2024  ARSEN: Insidious and Chronic    Current Condition:   Same    Pain:  Pain currently: 10/10  Pain level at worst: 10/10  Pain level at best: 7/10  Location: Left side of neck,   Description: shooting, burning  Aggravating Factors: Lifting with left arm, turning the neck, vacuuming, mopping,   Relieving Factors:  Looking down     Functional Limitations: Lifting     Prior Level of Function (PLOF)  Patient previously independent with all  ADLs. Currently at ??% of PLOF: patient unable to rate  Exercise/Physical Activity: No regular exercise  Work/School:     Relevant Information:  PMH: Knee and Hip pain/OA  Previous Tests/Imaging:   xray shoulder: No significant arthritic changes   x ray cervical spine: Anterior fusion C5-C7 without hardware failure   Previous Interventions/Treatments: Gabapentin, Tizanidine (makes her drowsy), Prednisone    Patients Living Environment: Reviewed and no concern    Primary Language: English    Patient's Goal(s) for Therapy: Lift arm without pain.     Red Flags: Do you have any of the following?   No:Fever/chills, unexplained weight changes, dizziness/fainting, unexplained change in bowel or bladder functions, unexplained malaise or muscle weakness, night sweats  Yes: night pain, numbness or tingling    Objective:  Objective     Posture: FH, Rounded shoulders    Palpation: Hypersensitive to touch L cervical and shoulder musculature.        ROM    Cervical AROM (Degrees)    Flexion: 35, posterior neck pull  Extension: 34, pressure in the back of the neck  (L) Side Bend: 17, L sided shooting neck pain  (R) Side Bend: 33, lessening of pain  (L) Rotation: 22, Pain  (R) Rotation: 50      Shoulder AROM (Degrees)      (R)  (L)  Flexion: 160  113, Pain   Abduction: 160  94, pain     Functional ER: 45  25, pain     Functional IR: T10  Glut         Elbow AROM (Degrees)      (R)  (L)  Flexion: WNL  WNL      Extension: WNL  WNL             Strength Testing    Shoulder MMT's not performed due to high levels of pain      Elbow    (R)  (L)  Flexion: 5  4-, reduced effort due to pain       Extension: 5  4           Special Tests    RTC/Impingement   Neer Impingement: L (+)   Delatorre Srikanth: L (+)   Painful Arc: L (+)  Biceps   Speeds Test: L (+)     Radicular Symptoms: (+)      Outcome Measures:  Other Measures  Disability of Arm Shoulder Hand (DASH): 70%       Goals: Set and discussed today  Active       PT Problem       PT  Goal 1       Start:  02/21/25    Expected End:  03/14/25       Adherence to home exercise program for symptom reduction and functional gains.            PT Goal 2       Start:  02/21/25    Expected End:  04/18/25       Left shoulder AROM WNL and without pain >4/10.  Cervical AROM WFL and without production of LUE pain > 4/10.  Left shoulder strength measures of at least 4/5.  Demonstrate good maintenance of neutral postural alignment throughout treatment session.  Don/doff clothing without pain >4/10 in left shoulder and neck region.  Do household chores with minimal  difficulty.  Sleep with minimal interruption due to left shoulder and neck pain.  Patient will rate pain < 5/10 at worst to improve ADL tolerance.             Plan of care was developed with input and agreement by the patient    Treatment Performed:    Therapeutic Exercise:    10 min  Instructed in personalized Home Exercise program  Access Code: [email or web address]  URL: [email or web address]  Date: 02/21/2025  Prepared by: Amanda Moore    Exercises  - Seated Shoulder Rolls  - 2 x daily - 7 x weekly - 20 reps  - Seated Scapular Retraction  - 2 x daily - 7 x weekly - 20 reps - 5 seconds hold  - Supine Chin Tuck  - 2 x daily - 7 x weekly - 10 reps - 3 seconds hold  - Supine Shoulder Flexion AAROM with Dowel  - 2 x daily - 7 x weekly - 20 reps    Self Care/Education:     5 min  Evaluation findings  Plan of care  Pain education  Posture      Charges: NAOMI Triana, TE x 1    Assessment: Patient presents with signs and symptoms consistent with cervical radiculopathy and L shoulder impingement, resulting in limited participation in pain-free ADLs and inability to perform at their prior level of function. Pt would benefit from physical therapy to address the impairments found & listed previously in the objective section in order to return to safe and pain-free ADLs and prior level of function.     Problem list: limited cervical and shoulder ROM,  limited strength, limited function, postural changes    Clinical Presentation: Stable and/or uncomplicated characteristics  Level of Complexity: Low    Plan:     Planned Interventions include: therapeutic exercise, self-care home management, manual therapy, therapeutic activities, neuromuscular coordination, vasopneumatic, dry needling  Frequency: 1-2 x Week  Duration: 8 Weeks  Rehab Potential/Prognosis: Sergio Moore, PT

## 2025-02-28 ENCOUNTER — APPOINTMENT (OUTPATIENT)
Dept: PHYSICAL THERAPY | Facility: CLINIC | Age: 55
End: 2025-02-28
Payer: COMMERCIAL

## 2025-03-07 ENCOUNTER — APPOINTMENT (OUTPATIENT)
Dept: PHYSICAL THERAPY | Facility: CLINIC | Age: 55
End: 2025-03-07
Payer: COMMERCIAL

## 2025-03-10 ENCOUNTER — APPOINTMENT (OUTPATIENT)
Dept: PRIMARY CARE | Facility: CLINIC | Age: 55
End: 2025-03-10
Payer: COMMERCIAL

## 2025-03-10 VITALS
WEIGHT: 227 LBS | HEART RATE: 96 BPM | OXYGEN SATURATION: 90 % | DIASTOLIC BLOOD PRESSURE: 79 MMHG | HEIGHT: 65 IN | BODY MASS INDEX: 37.82 KG/M2 | SYSTOLIC BLOOD PRESSURE: 119 MMHG

## 2025-03-10 DIAGNOSIS — Z00.00 ROUTINE GENERAL MEDICAL EXAMINATION AT A HEALTH CARE FACILITY: Primary | ICD-10-CM

## 2025-03-10 DIAGNOSIS — Z71.6 ENCOUNTER FOR SMOKING CESSATION COUNSELING: ICD-10-CM

## 2025-03-10 DIAGNOSIS — Z79.1 LONG TERM (CURRENT) USE OF NON-STEROIDAL ANTI-INFLAMMATORIES (NSAID): ICD-10-CM

## 2025-03-10 DIAGNOSIS — I10 PRIMARY HYPERTENSION: ICD-10-CM

## 2025-03-10 DIAGNOSIS — Z02.89 ENCOUNTER FOR COMPLETION OF FORM WITH PATIENT: ICD-10-CM

## 2025-03-10 DIAGNOSIS — F17.210 CIGARETTE NICOTINE DEPENDENCE WITHOUT COMPLICATION: ICD-10-CM

## 2025-03-10 DIAGNOSIS — F33.1 MODERATE EPISODE OF RECURRENT MAJOR DEPRESSIVE DISORDER: ICD-10-CM

## 2025-03-10 DIAGNOSIS — J41.0 SIMPLE CHRONIC BRONCHITIS (MULTI): ICD-10-CM

## 2025-03-10 DIAGNOSIS — R91.1 PULMONARY NODULE: ICD-10-CM

## 2025-03-10 DIAGNOSIS — J45.40 MODERATE PERSISTENT ASTHMA WITHOUT COMPLICATION (HHS-HCC): ICD-10-CM

## 2025-03-10 PROCEDURE — 99396 PREV VISIT EST AGE 40-64: CPT | Performed by: STUDENT IN AN ORGANIZED HEALTH CARE EDUCATION/TRAINING PROGRAM

## 2025-03-10 PROCEDURE — 99214 OFFICE O/P EST MOD 30 MIN: CPT | Performed by: STUDENT IN AN ORGANIZED HEALTH CARE EDUCATION/TRAINING PROGRAM

## 2025-03-10 PROCEDURE — 3074F SYST BP LT 130 MM HG: CPT | Performed by: STUDENT IN AN ORGANIZED HEALTH CARE EDUCATION/TRAINING PROGRAM

## 2025-03-10 PROCEDURE — 3008F BODY MASS INDEX DOCD: CPT | Performed by: STUDENT IN AN ORGANIZED HEALTH CARE EDUCATION/TRAINING PROGRAM

## 2025-03-10 PROCEDURE — 3078F DIAST BP <80 MM HG: CPT | Performed by: STUDENT IN AN ORGANIZED HEALTH CARE EDUCATION/TRAINING PROGRAM

## 2025-03-10 NOTE — PROGRESS NOTES
"  Subjective     Patient ID: Iwona Downey is a 54 y.o. female who presents for Annual Exam (APE. ).      Last Physical : ___Years ago     Pt's PMH, PSH, SH, FH , meds and allergies was obtained / reviewed and updated .     Dental visits : Y  Vision issues ::   Hearing issues : N    Immunizations : Y    Diet :     Exercise:  Weight concerns :     Alcohol: as noted in   Tobacco: as noted in   Recreational drug use : None/ as noted in   ================    No  longer a    Working as a       ======================================================    Visit Vitals  /79   Pulse 96   Ht 1.651 m (5' 5\")   Wt 103 kg (227 lb)   SpO2 90%   BMI 37.77 kg/m²   OB Status Hysterectomy   Smoking Status Every Day   BSA 2.17 m²      No LMP recorded. Patient has had a hysterectomy.   Current Outpatient Medications   Medication Instructions    albuterol (ProAir HFA) 90 mcg/actuation inhaler 2 puffs, inhalation, Every 4 hours PRN    atorvastatin (LIPITOR) 10 mg, oral, Nightly    cholecalciferol (Vitamin D-3) 50 mcg (2,000 unit) capsule Take by mouth.    cyanocobalamin (Vitamin B-12) 1,000 mcg tablet 1 tablet, Daily    fluticasone (Flonase) 50 mcg/actuation nasal spray 1-2 sprays, Each Nostril, Daily    fluticasone propion-salmeteroL (Advair Diskus) 250-50 mcg/dose diskus inhaler 1 puff, inhalation, Every 12 hours, Rinse mouth after each use    gabapentin (NEURONTIN) 600 mg, oral, 3 times daily    hydroCHLOROthiazide (HYDRODIURIL) 25 mg, oral, Daily    meclizine (Antivert) 25 mg tablet 3 times daily PRN    meloxicam (MOBIC) 15 mg, oral, Daily    methylPREDNISolone (Medrol, Deandre,) 4 mg tablets Follow schedule on package instructions    multivitamin tablet 1 tablet, Daily    SUMAtriptan (Imitrex) 100 mg tablet TAKE 1 TABLET AT THE FIRST SIGN OF HEADACHE , TAKE ANOTHER ONE IN 2 HRS IF NO IMPROVEMENT IN HEADACHE . MAXIMUM 2 TABLETS PER DAY    topiramate (TOPAMAX) 50 mg, oral, 2 times daily    traMADol (Ultram) " 50 mg tablet TAKE 1 TABLET (50 MG) BY MOUTH EVERY 6 HOURS IF NEEDED FOR SEVERE PAIN (7 - 10) FOR UP TO 7 DAYS.    valACYclovir (VALTREX) 1,000 mg, Every 12 hours    venlafaxine XR (EFFEXOR-XR) 37.5 mg, oral, Daily, Take along with 75mg    venlafaxine XR (EFFEXOR-XR) 75 mg, oral, Daily      Social History     Tobacco Use    Smoking status: Every Day     Current packs/day: 0.25     Types: Cigarettes    Smokeless tobacco: Never    Tobacco comments:     9/26/24 ;1/2ppd   Substance Use Topics    Alcohol use: Not Currently        =====================================    Review of systems:  Constitutional: no chills, no fever and no night sweats.     Eyes: no blurred vision and no eyesight problems.     ENT: no hearing loss, no nasal congestion, no nasal discharge, no hoarseness and no sore throat.     Cardiovascular: no chest pain, no intermittent leg claudication, no lower extremity edema, no palpitations and no syncope.     Respiratory: no cough, no shortness of breath during exertion, no shortness of breath at rest and no wheezing.     Gastrointestinal: no abdominal pain, no blood in stools, no constipation, no diarrhea, no melena, no nausea, no rectal pain and no vomiting.     Genitourinary: no dysuria, no change in urinary frequency, no urinary hesitancy, no feelings of urinary urgency and no vaginal discharge.     Musculoskeletal: no arthralgias, no back pain and no myalgias.     Integumentary: no new skin lesions and no rashes.     Neurological: no difficulty walking, no headache, no limb weakness, no numbness and no tingling.     Psychiatric: no anxiety, no depression, no anhedonia and no substance use disorders.   ============================================================    Physical exam :    Constitutional: Alert and in no acute distress. Well developed, well nourished.     Eyes: Normal external exam. Pupils were equal in size, round, reactive to light (PERRL) with normal accommodation and extraocular  movements intact (EOMI).     Ears, Nose, Mouth, and Throat: External inspection of ears and nose: Normal.  Otoscopic examination: Normal.      Neck: No neck mass was observed. Supple.     Cardiovascular: Heart rate and rhythm were normal, normal S1 and S2, no gallops, no murmurs and no pericardial rub    Pulmonary: No respiratory distress. Clear bilateral breath sounds.     Abdomen: Soft nontender; no abdominal mass palpated. No organomegaly.     Musculoskeletal: No joint swelling seen, normal movements of all extremities. Range of motion: Normal.  Muscle strength/tone: Normal.      Neurologic: Deep tendon reflexes were 2+ and symmetric. Sensation: Normal.     Psychiatric: Judgment and insight: Intact. Mood and affect: Normal.        Assessment/Plan    Diagnoses and all orders for this visit:  Routine general medical examination at a health care facility  Simple chronic bronchitis (Multi)  Moderate persistent asthma without complication (HHS-HCC)  Long term (current) use of non-steroidal anti-inflammatories (nsaid)  Cigarette nicotine dependence without complication  Primary hypertension  Moderate episode of recurrent major depressive disorder  Encounter for smoking cessation counseling  Pulmonary nodule  -     CT chest wo IV contrast; Future      53 y/o female with asthma, current every day smoker, HTN, migraine HA ,  h/o  intractable cervical radiculopathy secondary to cervical stenosis s/p C5-7 ACDF ( April 2022), mild DJD of SI joints , DJD of knee joints ( managed at [facility] , no recent Xrays to review ) and hips       Planning to retire in May 2025   Reduced smoking   Ct chest for surveillance of pulm nodules  Form completed for intermittently for migraine headaches continue current medications.  Smoking cessation readdressed.    HM :   Vaccines:  -Tdap : due 2028   -Flu :    -Shingles :  utd   - Prevnar 20 :  09/2023     Cancer screenings:  -Mammogram :  current   -Colonoscopy:   last done in 2024   -PAP :  to fu  with gyn     General preventive care discussed which includes regular exercise, healthy eating habits, to include more of plant based diet, to include foods of all colors  , limiting excessive red meat intake , staying active to maintain a weight that is appropriate for his/ her height / making efforts to reach an ideal weight for height,  avoidance of smoking, excess alcohol consumption, avoidance of recreational drugs, safe and responsible sexual relationships and practices.     It is recommended to get 150 mins of  moderate intensity  ( you should be able to talk and not sing ) exercise in a week .     Calcium + Vit D supplements recommended   Regular exercise recommended   Healthy eating choices discussed and recommended   BMI ( Body mass index ) discussed and encouraged weight loss through the above discussed lifestyle modifications .     Conditions addressed and mgmt as noted above.  Pertinent labs, images/ imaging reports , chart review was done .   Age appropriate labs / labs for mgmt of chronic medical conditions ordered, further mgmt pending the results.         RTO in  June or sooner if needed . Labs to be done soon.     This note is intended for the physician writing it, as well as to communicate findings to other healthcare professionals. These notes use medical lexicon that may be misunderstood by non medical persons. Therefore, interpretations of medical notes and terminology should be approached with caution.

## 2025-03-14 ENCOUNTER — DOCUMENTATION (OUTPATIENT)
Dept: PHYSICAL THERAPY | Facility: CLINIC | Age: 55
End: 2025-03-14
Payer: COMMERCIAL

## 2025-03-14 NOTE — PROGRESS NOTES
Physical Therapy                 Therapy Communication Note    Patient Name: Iwona Downey  MRN: [id number]  Department:   Room: Room/bed info not found  Today's Date: 3/14/2025     Discipline: Physical Therapy          Missed Visit Reason:      Missed Time: No Show    Comment:

## 2025-03-17 ENCOUNTER — TRANSCRIBE ORDERS (OUTPATIENT)
Dept: ORTHOPEDIC SURGERY | Facility: HOSPITAL | Age: 55
End: 2025-03-17
Payer: COMMERCIAL

## 2025-03-17 DIAGNOSIS — M54.2 CERVICAL PAIN: ICD-10-CM

## 2025-03-18 ENCOUNTER — APPOINTMENT (OUTPATIENT)
Dept: RADIOLOGY | Facility: CLINIC | Age: 55
End: 2025-03-18
Payer: COMMERCIAL

## 2025-03-18 ENCOUNTER — APPOINTMENT (OUTPATIENT)
Dept: ORTHOPEDIC SURGERY | Facility: CLINIC | Age: 55
End: 2025-03-18
Payer: COMMERCIAL

## 2025-04-01 ENCOUNTER — APPOINTMENT (OUTPATIENT)
Dept: RADIOLOGY | Facility: CLINIC | Age: 55
End: 2025-04-01
Payer: COMMERCIAL

## 2025-04-08 DIAGNOSIS — G43.E09 CHRONIC MIGRAINE WITH AURA WITHOUT STATUS MIGRAINOSUS, NOT INTRACTABLE: ICD-10-CM

## 2025-04-08 RX ORDER — SUMATRIPTAN SUCCINATE 100 MG/1
TABLET ORAL
Qty: 9 TABLET | Refills: 2 | Status: SHIPPED | OUTPATIENT
Start: 2025-04-08

## 2025-04-12 LAB
25(OH)D3+25(OH)D2 SERPL-MCNC: 48 NG/ML (ref 30–100)
ALBUMIN SERPL-MCNC: 4.2 G/DL (ref 3.6–5.1)
ALP SERPL-CCNC: 71 U/L (ref 37–153)
ALT SERPL-CCNC: 17 U/L (ref 6–29)
ANION GAP SERPL CALCULATED.4IONS-SCNC: 6 MMOL/L (CALC) (ref 7–17)
AST SERPL-CCNC: 20 U/L (ref 10–35)
BILIRUB SERPL-MCNC: 0.5 MG/DL (ref 0.2–1.2)
BUN SERPL-MCNC: 19 MG/DL (ref 7–25)
CALCIUM SERPL-MCNC: 9.4 MG/DL (ref 8.6–10.4)
CHLORIDE SERPL-SCNC: 105 MMOL/L (ref 98–110)
CHOLEST SERPL-MCNC: 182 MG/DL
CHOLEST/HDLC SERPL: 3.1 (CALC)
CO2 SERPL-SCNC: 30 MMOL/L (ref 20–32)
CREAT SERPL-MCNC: 0.82 MG/DL (ref 0.5–1.03)
EGFRCR SERPLBLD CKD-EPI 2021: 84 ML/MIN/1.73M2
ERYTHROCYTE [DISTWIDTH] IN BLOOD BY AUTOMATED COUNT: 13.3 % (ref 11–15)
EST. AVERAGE GLUCOSE BLD GHB EST-MCNC: 114 MG/DL
EST. AVERAGE GLUCOSE BLD GHB EST-SCNC: 6.3 MMOL/L
GLUCOSE SERPL-MCNC: 88 MG/DL (ref 65–99)
HBA1C MFR BLD: 5.6 % OF TOTAL HGB
HCT VFR BLD AUTO: 41.6 % (ref 35–45)
HDLC SERPL-MCNC: 58 MG/DL
HGB BLD-MCNC: 13.8 G/DL (ref 11.7–15.5)
LDLC SERPL CALC-MCNC: 110 MG/DL (CALC)
MCH RBC QN AUTO: 31.7 PG (ref 27–33)
MCHC RBC AUTO-ENTMCNC: 33.2 G/DL (ref 32–36)
MCV RBC AUTO: 95.6 FL (ref 80–100)
NONHDLC SERPL-MCNC: 124 MG/DL (CALC)
PLATELET # BLD AUTO: 338 THOUSAND/UL (ref 140–400)
PMV BLD REES-ECKER: 10.4 FL (ref 7.5–12.5)
POTASSIUM SERPL-SCNC: 3.9 MMOL/L (ref 3.5–5.3)
PROT SERPL-MCNC: 7.3 G/DL (ref 6.1–8.1)
RBC # BLD AUTO: 4.35 MILLION/UL (ref 3.8–5.1)
SODIUM SERPL-SCNC: 141 MMOL/L (ref 135–146)
TRIGL SERPL-MCNC: 49 MG/DL
TSH SERPL-ACNC: 0.84 MIU/L (ref 0.4–4.5)
WBC # BLD AUTO: 12.3 THOUSAND/UL (ref 3.8–10.8)

## 2025-04-15 DIAGNOSIS — M62.838 MUSCLE SPASM: ICD-10-CM

## 2025-04-15 RX ORDER — TIZANIDINE HYDROCHLORIDE 4 MG/1
4 CAPSULE, GELATIN COATED ORAL 3 TIMES DAILY
Qty: 90 CAPSULE | Refills: 3 | Status: SHIPPED | OUTPATIENT
Start: 2025-04-15

## 2025-04-15 RX ORDER — TIZANIDINE HYDROCHLORIDE 4 MG/1
4 CAPSULE, GELATIN COATED ORAL 3 TIMES DAILY
COMMUNITY
End: 2025-04-15 | Stop reason: SDUPTHER

## 2025-04-19 DIAGNOSIS — M47.22 OSTEOARTHRITIS OF SPINE WITH RADICULOPATHY, CERVICAL REGION: ICD-10-CM

## 2025-04-19 DIAGNOSIS — M17.0 OSTEOARTHRITIS OF BOTH KNEES, UNSPECIFIED OSTEOARTHRITIS TYPE: ICD-10-CM

## 2025-04-19 DIAGNOSIS — M46.1 DEGENERATIVE JOINT DISEASE OF SACROILIAC JOINT: ICD-10-CM

## 2025-04-19 DIAGNOSIS — Z98.1 S/P CERVICAL SPINAL FUSION: ICD-10-CM

## 2025-04-22 RX ORDER — MELOXICAM 15 MG/1
15 TABLET ORAL DAILY
Qty: 90 TABLET | Refills: 0 | Status: SHIPPED | OUTPATIENT
Start: 2025-04-22

## 2025-05-27 ENCOUNTER — APPOINTMENT (OUTPATIENT)
Dept: ORTHOPEDIC SURGERY | Facility: HOSPITAL | Age: 55
End: 2025-05-27
Payer: COMMERCIAL

## 2025-05-27 DIAGNOSIS — M75.82 ROTATOR CUFF TENDINITIS, LEFT: Primary | ICD-10-CM

## 2025-05-27 PROCEDURE — 99213 OFFICE O/P EST LOW 20 MIN: CPT | Mod: 25 | Performed by: NURSE PRACTITIONER

## 2025-05-27 PROCEDURE — 2500000004 HC RX 250 GENERAL PHARMACY W/ HCPCS (ALT 636 FOR OP/ED): Performed by: NURSE PRACTITIONER

## 2025-05-27 PROCEDURE — 99213 OFFICE O/P EST LOW 20 MIN: CPT | Performed by: NURSE PRACTITIONER

## 2025-05-27 PROCEDURE — 20610 DRAIN/INJ JOINT/BURSA W/O US: CPT | Mod: LT | Performed by: NURSE PRACTITIONER

## 2025-05-27 RX ADMIN — LIDOCAINE HYDROCHLORIDE 4 ML: 10 INJECTION, SOLUTION INFILTRATION; PERINEURAL at 13:15

## 2025-05-27 RX ADMIN — TRIAMCINOLONE ACETONIDE 40 MG: 400 INJECTION, SUSPENSION INTRA-ARTICULAR; INTRAMUSCULAR at 13:15

## 2025-05-27 NOTE — PROGRESS NOTES
L Inj/Asp: L subacromial bursa on 5/27/2025 1:15 PM  Indications: pain  Details: 21 G needle, posterior approach  Medications: 40 mg triamcinolone acetonide 40 mg/mL; 4 mL lidocaine 10 mg/mL (1 %)  Procedure, treatment alternatives, risks and benefits explained, specific risks discussed. Consent was given by the patient. Immediately prior to procedure a time out was called to verify the correct patient, procedure, equipment, support staff and site/side marked as required. Patient was prepped and draped in the usual sterile fashion.       Provider Impression/Assessment:  Iwona Downey is a 54 y.o. female with left shoulder pain consistent with rotator cuff tendinitis and biceps tendinitis. Responding well to injections. She has undergone cervical fusion with Dr Cosby.      Patient Discussion/Plan:  At this time we reviewed with the patient regarding the diagnosis. Rotator cuff disease is a spectrum of disorders ranging from rotator cuff tendinitis to full-thickness rotator cuff tears. We know that some patients over the age of 50 will have symptomatic rotator cuff tears due to overuse and general wear and tear. In general most patients who come in with complaints such as these will get better with home therapy, NSAIDs and injections alone. The therapy should be performed 2-3 times a day and should take about 10-15 minutes to perform each time.      She will continue with the provider directed home exercise program, along with correlating website for home therapy daily. She has completed formal physical therapy. She can also take NSAIDs as needed for pain. Be sure to take medication with food.      They tolerated the injection well today for their LEFT shoulder. We will see how they do after conservative management of therapy, OTC medications and injections. If they do not get sustained relief, consideration for a repeat injection versus advanced imaging can be made. We have filled out her work paper today with  restrictions for her left arm due to the nature of her position. She should not lift more than 10 pounds at waist level and no repetitive push/pull with her left shoulder.      They have been scheduled to return in 12 weeks for a repeat clinical check. No repeat imaging needed at that time. If symptoms improve, they can see us back on an as-needed basis. All patient's questions were answered to their satisfaction today and they are comfortable with the above plan.      Should you have any questions or concerns after your visit today, please do not hesitate to call the office at [phone]. We strive to give you high-quality, patient-centered, collaborative care and I am honored to be a part of your health care team.    ----------------------------------------------------------------------------------------------------------  CHIEF COMPLAINT:  FUV LEFT SHOULDER  INJECTION     History of Present Illness:  Iwona Downey is a pleasant 54 y.o. right hand dominant female with a history of fibromyalgia and cervical spine fusion. She presents today with left shoulder pain. She was referred to our clinic after being seen in the [facility] on 12/23/24 by Ryan Ramirez PA-C. Left shoulder XR completed 12/23/24, cervical spine XR completed 9/17/24. She is a  with the [employer]. She was a  prior to that for many years. She has a long history of left shoulder pain that has worsened since beginning her new role as . Iwona states that she has shooting pain and skin sensitivity in the posterior shoulder. She has been prescribed Prednisone in the past that has provided some pain relief. She will begin formal physical therapy in the next few weeks for her neck and shoulder pain. Iwona has been seen by Dr. Ovalles for left carpal tunnel injections and had a cervical spine surgery with Dr. Cosby in the past. She reports that she had neck and shoulder pain prior to her cervical spine  surgery. The numbness she was experiencing is gone in her upper extremity since cervical fusion. Denies any numbness or tingling today.      They have not tried any therapy. She has not had any previous shoulder injections. The shoulder has not really gotten any better. 75% of a normal shoulder.  4 out of 10 pain on average. 9 out of 10 at their worst. Does wake them from sleep. Does not feel unstable. Does not feel stiff. No numbness or tingling. Taking over-the counter medications for pain relief. The opposite shoulder is normal, without complaint. Denies fevers or chills.     5/27/25  Iwona Downey is a 54 y.o. female with left shoulder pain consistent with rotator cuff tendinitis and biceps tendinitis.  She is returning to clinic today for an injection and paperwork for work. 7/10 pain today. She is set to retire end of this month. She is requesting another left shoulder injection today and repeat paperwork for work with restrictions. She continues to do daily home therapy for her shoulder. She has already completed formal PT. No issues with her right shoulder today. Denies any new injury to either shoulder since her last visit.      Smoking Status: Non-smoker  Diabetic: Denies  BMI: 38  NKDA     Past medical history, surgical history, social history and family history were all reviewed and are per the patient's health history questionnaire. Family history is not pertinent to the presenting problem.      Allergies:  Allergies   No Known Allergies        Medical History        Past Medical History:   Diagnosis Date    Asthma      Fibromyalgia      HTN (hypertension)      Migraines      VEGA (obstructive sleep apnea)              Surgical History         Past Surgical History:   Procedure Laterality Date    ANTERIOR CERVICAL DISCECTOMY W/ FUSION   04/11/2022     C5-7    COLONOSCOPY        KNEE ARTHROSCOPY W/ DEBRIDEMENT Bilateral      LAPAROSCOPIC HYSTERECTOMY        SKIN GRAFT Right       age 2, right chest             Social History               Socioeconomic History    Marital status: Single       Spouse name: Not on file    Number of children: Not on file    Years of education: Not on file    Highest education level: Not on file   Occupational History    Not on file   Tobacco Use    Smoking status: Every Day       Current packs/day: 0.25       Types: Cigarettes    Smokeless tobacco: Never    Tobacco comments:       9/26/24 ;1/2ppd   Vaping Use    Vaping status: Never Used   Substance and Sexual Activity    Alcohol use: Not Currently    Drug use: Not Currently    Sexual activity: Not on file   Other Topics Concern    Not on file   Social History Narrative    Not on file      Social Drivers of Health      Financial Resource Strain: Not on file   Food Insecurity: Not on file   Transportation Needs: Not on file   Physical Activity: Not on file   Stress: Not on file   Social Connections: Not on file   Intimate Partner Violence: Not on file   Housing Stability: Not on file            Medications:    Current Medications      Current Outpatient Medications:     albuterol (ProAir HFA) 90 mcg/actuation inhaler, Inhale 2 puffs every 4 hours if needed for shortness of breath., Disp: 18 g, Rfl: 3    atorvastatin (Lipitor) 10 mg tablet, Take 1 tablet (10 mg) by mouth once daily at bedtime., Disp: 90 tablet, Rfl: 3    benzoyl peroxide 5 % external wash, WASH AFFECTED AREAS ONCE DAILY IN THE MORNING, Disp: 142 g, Rfl: 0    cholecalciferol (Vitamin D-3) 50 mcg (2,000 unit) capsule, Take by mouth., Disp: , Rfl:     clindamycin (Cleocin T) 1 % lotion, APPLY TOPICALLY TO AFFECTED AREA TWICE A DAY OR UP TO 3-4 TIMES PER DAY AS NEEDED FOR ACTIVE LESIONS, Disp: 60 mL, Rfl: 0    cyanocobalamin (Vitamin B-12) 1,000 mcg tablet, Take 1 tablet (1,000 mcg) by mouth once daily., Disp: , Rfl:     fluticasone (Flonase) 50 mcg/actuation nasal spray, ADMINISTER 1-2 SPRAYS INTO EACH NOSTRIL ONCE DAILY., Disp: 48 mL, Rfl: 2    fluticasone propion-salmeteroL  (Advair Diskus) 250-50 mcg/dose diskus inhaler, Inhale 1 puff every 12 hours. Rinse mouth after each use, Disp: 60 each, Rfl: 11    gabapentin (Neurontin) 300 mg capsule, TAKE 2 CAPSULES (600 MG) BY MOUTH 3 TIMES A DAY., Disp: 180 capsule, Rfl: 3    hydroCHLOROthiazide (HYDRODiuril) 25 mg tablet, TAKE 1 TABLET BY MOUTH EVERY DAY, Disp: 90 tablet, Rfl: 1    ketotifen (Zaditor) 0.025 % (0.035 %) ophthalmic solution, , Disp: , Rfl:     meclizine (Antivert) 25 mg tablet, Take by mouth 3 times a day as needed., Disp: , Rfl:     meloxicam (Mobic) 15 mg tablet, Take 1 tablet (15 mg) by mouth once daily., Disp: 90 tablet, Rfl: 1    multivitamin tablet, Take 1 tablet by mouth once daily., Disp: , Rfl:     SUMAtriptan (Imitrex) 100 mg tablet, TAKE 1 TABLET AT THE FIRST SIGN OF HEADACHE , TAKE ANOTHER ONE IN 2 HRS IF NO IMPROVEMENT IN HEADACHE . MAXIMUM 2 TABLETS PER DAY, Disp: 9 tablet, Rfl: 2    topiramate (Topamax) 50 mg tablet, Take 1 tablet (50 mg) by mouth 2 times a day., Disp: 180 tablet, Rfl: 3    traMADol (Ultram) 50 mg tablet, TAKE 1 TABLET (50 MG) BY MOUTH EVERY 6 HOURS IF NEEDED FOR SEVERE PAIN (7 - 10) FOR UP TO 7 DAYS., Disp: , Rfl:     triamcinolone (Kenalog) 0.1 % ointment, , Disp: , Rfl:     valACYclovir (Valtrex) 1 gram tablet, Take 1 tablet (1,000 mg) by mouth every 12 hours., Disp: , Rfl:     venlafaxine XR (Effexor-XR) 37.5 mg 24 hr capsule, Take 1 capsule (37.5 mg) by mouth once daily. Take along with 75mg, Disp: 90 capsule, Rfl: 3    venlafaxine XR (Effexor-XR) 75 mg 24 hr capsule, Take 1 capsule (75 mg) by mouth once daily., Disp: 90 capsule, Rfl: 3        Family History:  Family History          Family History   Problem Relation Name Age of Onset    Lung cancer Mother        Pancreatic cancer Father        Breast cancer Mother's Sister        Breast cancer Maternal Grandmother        Pancreatic cancer Maternal Grandfather                Review of Systems:   Review of systems for all 14 systems is  negative for complaint except for the above noted       findings in the history of present illness.     Diagnoses/Problems:  Left shoulder pain     Physical Examination:  Patient is a well-developed, well nourished female in no acute distress. Awake, alert and oriented x3, with appropriate mood. Breathes with normal chest rises. Eye exam reveals round pupils with clear sclera. Gait is steady.     Patient had full range of motion of bilateral elbows. 5 out of 5 bilateral wrist flexion and extension. Thumb extension positive bilaterally.     Examination of the right shoulder today reveals the skin to be intact. There is no sign any atrophy lesions or abrasions. There is no pain to palpation of the bony prominences. Range of motion of the right shoulder revealed 0-170° of forward elevation. 0-60° of external rotation. Internal rotation was to T12. Provocative maneuvers today were negative.     Examination of the left shoulder reveals the skin to be intact. No sign any atrophy lesions or abrasions. There is mild tenderness with palpation along the anterior chromium and greater tuberosity. Moderate tenderness with palpation over parascapular region and trapezius tightness. Active range of motion is 150° of forward elevation, passively correctable to 160. 40° of external rotation. Internal rotation was to T12. Abduction to 140°. Bicipital groove POSITIVE. Speeds test POSITIVE  Jobes test POSITIVE, 4+ out of 5    Neers sign POSITIVE   Delatorre sign POSITIVE  External/Internal resistance NEGATIVE, 5/5 strength     Results/Imaging:  Independent review of the imaging of left shoulder shows no signs of any fracture, dislocation or arthritis. I personally spent time viewing digital images of the radiology testing with the patient. I explained the results to the patient, along with suggested explanation for follow up recommendations based on testing and clinical results.     No new images are attached to the encounter.       Procedure  Risks, benefits and alternatives of injection discussed with the patient prior to injection. After receiving verbal informed consent from the patient, I sterilely prepped the LEFT shoulder posteriorly and under sterile conditions injected 40mg of Kenalog and 4ml of 1% lidocaine into the posterior subacromial space. Injection site wiped with a sterile gauze after procedure and Band-Aid placed. The patient tolerated the procedure well and without complication. Injection under guidance of Dr Yuen. They can use ice on injection site if discomfort following injection today. Allow 24-48 hours for the injection to start to relieve pain and discomfort of the shoulder. Limit overhead and lifting activities for 48 hours.     *While intending to generate a timely document that accurately reflects the content of the visit, no guarantee can be provided that every grammatical or spelling mistake has been or will be identified or corrected. Thank you for your understanding.

## 2025-05-27 NOTE — LETTER
May 29, 2025     Patient: Iwona Downey   YOB: 1970   Date of Visit: 5/27/2025       To Whom It May Concern:    It is my medical opinion that Iwona Downey may return back to work with the following restrictions  No repetitive pushing or pulling with left arm  No lifting over 5lbs past waist level for the next 6 weeks.    If you have any questions or concerns, please don't hesitate to call [phone]         Sincerely,        Denia Avery, APRN-CNP

## 2025-05-29 RX ORDER — TRIAMCINOLONE ACETONIDE 40 MG/ML
40 INJECTION, SUSPENSION INTRA-ARTICULAR; INTRAMUSCULAR
Status: COMPLETED | OUTPATIENT
Start: 2025-05-27 | End: 2025-05-27

## 2025-05-29 RX ORDER — LIDOCAINE HYDROCHLORIDE 10 MG/ML
4 INJECTION, SOLUTION INFILTRATION; PERINEURAL
Status: COMPLETED | OUTPATIENT
Start: 2025-05-27 | End: 2025-05-27

## 2025-06-12 ENCOUNTER — APPOINTMENT (OUTPATIENT)
Dept: PRIMARY CARE | Facility: CLINIC | Age: 55
End: 2025-06-12
Payer: COMMERCIAL

## 2025-06-12 VITALS
SYSTOLIC BLOOD PRESSURE: 123 MMHG | HEIGHT: 65 IN | BODY MASS INDEX: 38.05 KG/M2 | WEIGHT: 228.4 LBS | HEART RATE: 94 BPM | OXYGEN SATURATION: 89 % | DIASTOLIC BLOOD PRESSURE: 80 MMHG

## 2025-06-12 DIAGNOSIS — I10 PRIMARY HYPERTENSION: Primary | ICD-10-CM

## 2025-06-12 DIAGNOSIS — F41.8 DEPRESSION WITH ANXIETY: ICD-10-CM

## 2025-06-12 DIAGNOSIS — Z71.6 ENCOUNTER FOR SMOKING CESSATION COUNSELING: ICD-10-CM

## 2025-06-12 DIAGNOSIS — J44.89 ASTHMA WITH COPD (MULTI): ICD-10-CM

## 2025-06-12 DIAGNOSIS — Z79.1 NSAID LONG-TERM USE: ICD-10-CM

## 2025-06-12 DIAGNOSIS — F33.1 MODERATE EPISODE OF RECURRENT MAJOR DEPRESSIVE DISORDER: ICD-10-CM

## 2025-06-12 DIAGNOSIS — E78.2 MIXED HYPERLIPIDEMIA: ICD-10-CM

## 2025-06-12 PROCEDURE — 3008F BODY MASS INDEX DOCD: CPT | Performed by: STUDENT IN AN ORGANIZED HEALTH CARE EDUCATION/TRAINING PROGRAM

## 2025-06-12 PROCEDURE — 3079F DIAST BP 80-89 MM HG: CPT | Performed by: STUDENT IN AN ORGANIZED HEALTH CARE EDUCATION/TRAINING PROGRAM

## 2025-06-12 PROCEDURE — 99214 OFFICE O/P EST MOD 30 MIN: CPT | Performed by: STUDENT IN AN ORGANIZED HEALTH CARE EDUCATION/TRAINING PROGRAM

## 2025-06-12 PROCEDURE — 3074F SYST BP LT 130 MM HG: CPT | Performed by: STUDENT IN AN ORGANIZED HEALTH CARE EDUCATION/TRAINING PROGRAM

## 2025-06-12 RX ORDER — VENLAFAXINE HYDROCHLORIDE 37.5 MG/1
37.5 CAPSULE, EXTENDED RELEASE ORAL DAILY
Qty: 90 CAPSULE | Refills: 3 | Status: SHIPPED | OUTPATIENT
Start: 2025-06-12

## 2025-06-12 RX ORDER — IBUPROFEN 200 MG
1 TABLET ORAL EVERY 24 HOURS
Qty: 42 PATCH | Refills: 0 | Status: SHIPPED | OUTPATIENT
Start: 2025-06-12 | End: 2025-07-24

## 2025-06-12 RX ORDER — HYDROCHLOROTHIAZIDE 25 MG/1
25 TABLET ORAL DAILY
Qty: 90 TABLET | Refills: 1 | Status: SHIPPED | OUTPATIENT
Start: 2025-06-12

## 2025-06-12 RX ORDER — VENLAFAXINE HYDROCHLORIDE 75 MG/1
75 CAPSULE, EXTENDED RELEASE ORAL DAILY
Qty: 90 CAPSULE | Refills: 3 | Status: SHIPPED | OUTPATIENT
Start: 2025-06-12

## 2025-06-12 RX ORDER — ATORVASTATIN CALCIUM 10 MG/1
10 TABLET, FILM COATED ORAL NIGHTLY
Qty: 90 TABLET | Refills: 3 | Status: SHIPPED | OUTPATIENT
Start: 2025-06-12

## 2025-06-12 RX ORDER — NICOTINE 7MG/24HR
1 PATCH, TRANSDERMAL 24 HOURS TRANSDERMAL EVERY 24 HOURS
Qty: 30 PATCH | Refills: 0 | Status: SHIPPED | OUTPATIENT
Start: 2025-06-12 | End: 2025-07-12

## 2025-06-12 NOTE — PROGRESS NOTES
"Subjective   Patient ID: Iwona Downey is a 54 y.o. female who presents for Follow-up (3 month follow-up. ).        HPI    53 y/o female with asthma, current every day smoker, HTN, migraine HA ,  h/o  intractable cervical radiculopathy secondary to cervical stenosis s/p C5-7 ACDF ( April 2022), mild DJD of SI joints , DJD of knee joints ( managed at [facility] , no recent Xrays to review ) and hips      Retired in May 2025, worked for 30 years     Continues to smoke . 1/2 ppd  Ready to quit .      Visit Vitals  /80   Pulse 94   Ht 1.651 m (5' 5\")   Wt 104 kg (228 lb 6.4 oz)   SpO2 (!) 89%   BMI 38.01 kg/m²   OB Status Hysterectomy   Smoking Status Every Day   BSA 2.18 m²      No LMP recorded. Patient has had a hysterectomy.   Current Outpatient Medications   Medication Instructions    albuterol (ProAir HFA) 90 mcg/actuation inhaler 2 puffs, inhalation, Every 4 hours PRN    atorvastatin (LIPITOR) 10 mg, oral, Nightly    cholecalciferol (Vitamin D-3) 50 mcg (2,000 unit) capsule Take by mouth.    cyanocobalamin (Vitamin B-12) 1,000 mcg tablet 1 tablet, Daily    fluticasone (Flonase) 50 mcg/actuation nasal spray 1-2 sprays, Each Nostril, Daily    fluticasone propion-salmeteroL (Advair Diskus) 250-50 mcg/dose diskus inhaler 1 puff, inhalation, Every 12 hours, Rinse mouth after each use    gabapentin (NEURONTIN) 600 mg, oral, 3 times daily    hydroCHLOROthiazide (HYDRODIURIL) 25 mg, oral, Daily    meclizine (Antivert) 25 mg tablet 3 times daily PRN    meloxicam (MOBIC) 15 mg, oral, Daily    multivitamin tablet 1 tablet, Daily    nicotine (Nicoderm CQ) 14 mg/24 hr patch 1 patch, transdermal, Every 24 hours    nicotine (Nicoderm CQ) 7 mg/24 hr patch 1 patch, transdermal, Every 24 hours    SUMAtriptan (Imitrex) 100 mg tablet TAKE 1 TABLET AT THE FIRST SIGN OF HEADACHE , TAKE ANOTHER ONE IN 2 HRS IF NO IMPROVEMENT IN HEADACHE . MAX 2 TABLETS PER DAY    tiZANidine (ZANAFLEX) 4 mg, oral, 3 times daily    topiramate (TOPAMAX) 50 " mg, oral, 2 times daily    valACYclovir (VALTREX) 1,000 mg, Every 12 hours    venlafaxine XR (EFFEXOR-XR) 75 mg, oral, Daily    venlafaxine XR (EFFEXOR-XR) 37.5 mg, oral, Daily, Take along with 75mg      Social History     Tobacco Use    Smoking status: Every Day     Current packs/day: 0.25     Types: Cigarettes    Smokeless tobacco: Never    Tobacco comments:     9/26/24 ;1/2ppd   Substance Use Topics    Alcohol use: Not Currently        Review of Systems    Constitutional : No feeling poorly / fevers/ chills / night sweats/ fatigue   Cardiovascular : No CP /Palpitations/ lower extremity edema / syncope   Respiratory : No Cough /MONTIEL/Dyspnea at rest   Gastrointestinal : No abd pain / N/V  No bloody stools/ melena / constipation  Endo : No polyuria/polydipsia/ muscle weakness / sluggishness   CNS: No confusion / HA/ tingling/ numbness/ weakness of extremities  Psychiatric: No anxiety/ depression/ SI/HI    All other systems have been reviewed and are negative for complaint       Physical Exam    Constitutional : Vitals reviewed. Alert and in no distress  Cardiovascular : RRR, Normal S1, S2, no peripheral edema   Pulmonary: No respiratory distress, CTAB   MSK : Normal gait and station , strength and tone     Neurologic : CNs 2-12 grossly intact , no obvious FNDs  Psych : A,Ox3, normal mood and affect      Assessment/Plan   Diagnoses and all orders for this visit:  Primary hypertension  -     hydroCHLOROthiazide (HYDRODiuril) 25 mg tablet; Take 1 tablet (25 mg) by mouth once daily.  Asthma with COPD (Multi)  -     Referral to Clinical Pharmacy; Future  Encounter for smoking cessation counseling  -     nicotine (Nicoderm CQ) 14 mg/24 hr patch; Place 1 patch over 24 hours on the skin once every 24 hours.  -     nicotine (Nicoderm CQ) 7 mg/24 hr patch; Place 1 patch over 24 hours on the skin once every 24 hours.  Mixed hyperlipidemia  -     atorvastatin (Lipitor) 10 mg tablet; Take 1 tablet (10 mg) by mouth once daily at  bedtime.  Moderate episode of recurrent major depressive disorder  -     venlafaxine XR (Effexor-XR) 75 mg 24 hr capsule; Take 1 capsule (75 mg) by mouth once daily.  -     venlafaxine XR (Effexor-XR) 37.5 mg 24 hr capsule; Take 1 capsule (37.5 mg) by mouth once daily. Take along with 75mg  Depression with anxiety  -     venlafaxine XR (Effexor-XR) 75 mg 24 hr capsule; Take 1 capsule (75 mg) by mouth once daily.  -     venlafaxine XR (Effexor-XR) 37.5 mg 24 hr capsule; Take 1 capsule (37.5 mg) by mouth once daily. Take along with 75mg  NSAID long-term use      55 y/o female with asthma, current every day smoker, HTN, migraine HA ,  h/o  intractable cervical radiculopathy secondary to cervical stenosis s/p C5-7 ACDF ( April 2022), mild DJD of SI joints , DJD of knee joints ( managed at [facility] , no recent Xrays to review ) and hips     # HTN : controlled   # HPL : has not been taking lipitor 10mg, rx sent , pt informed .   # Smoking cessation addressed , NRT as above   # Anxiety and depression : stable on the med , rx as above   # Chronic pain : Gabapentin + topiramate +meloxicam   Medications like Aleve ( Naproxen), advil ( Ibuprofen ) , meloxicam , aspirin etc., when used in excess on a regular basis can cause stomach bleed and kidney damage.   These medications are advised to be taken with food and sparingly.      Labs from 4/'25 reviewed and discussed   RTO  in 4m     Conditions addressed and mgmt as noted above.  Pertinent labs, images/ imaging reports , chart review was done .   Age appropriate labs / labs for mgmt of chronic medical conditions ordered, further mgmt pending the results.         RTO in  m or sooner if needed . Labs to be done few days prior to the next visit.    For the sake of billing , entire note needs to be taken into consideration. Repetition of meds is avoided for the sake of redundancy. Med list above reflects reconciled meds. Management of abnormal results may not always result in an  addendum to the note , an annotation at the end of result or communication via pt portal is to be considered .      This note is intended for the physician writing it, as well as to communicate findings to other healthcare professionals. These notes use medical lexicon that may be misunderstood by non medical persons. Therefore, interpretations of medical notes and terminology should be approached with caution.

## 2025-06-22 DIAGNOSIS — M79.7 FIBROMYALGIA: ICD-10-CM

## 2025-06-23 ENCOUNTER — APPOINTMENT (OUTPATIENT)
Dept: PHARMACY | Facility: HOSPITAL | Age: 55
End: 2025-06-23
Payer: COMMERCIAL

## 2025-06-26 RX ORDER — GABAPENTIN 300 MG/1
600 CAPSULE ORAL 3 TIMES DAILY
Qty: 180 CAPSULE | Refills: 3 | Status: SHIPPED | OUTPATIENT
Start: 2025-06-26

## 2025-07-05 DIAGNOSIS — Z71.6 ENCOUNTER FOR SMOKING CESSATION COUNSELING: ICD-10-CM

## 2025-07-07 RX ORDER — IBUPROFEN 200 MG
1 TABLET ORAL EVERY 24 HOURS
Qty: 28 PATCH | Refills: 1 | Status: SHIPPED | OUTPATIENT
Start: 2025-07-07 | End: 2025-08-18

## 2025-07-08 ENCOUNTER — APPOINTMENT (OUTPATIENT)
Dept: PHARMACY | Facility: HOSPITAL | Age: 55
End: 2025-07-08
Payer: COMMERCIAL

## 2025-07-08 DIAGNOSIS — J44.89 ASTHMA WITH COPD (MULTI): ICD-10-CM

## 2025-07-08 NOTE — PROGRESS NOTES
Clinical Pharmacy Appointment    Patient ID: Iwona Downey is a 54 y.o. female who presents for COPD.    Pt is here for First appointment.     Referring Provider: Clara Wharton,*  PCP: Clara Wharton MD  Last visit with PCP: 6/12/25   Next visit with PCP: 10/16/25    Subjective   Medication Reconciliation:  Changed: none  Added: none  Discontinued: meclizine    Drug Interactions  No relevant drug interactions were noted.    Medication System Management  Patient's preferred pharmacy: [facility]  Adherence/Organization: No issues  Affordability/Accessibility: No issues    HPI  PULMONARY ASSESSMENT  Patient has been diagnosed with: Emphysema  Does patient see pulmonology: No  has not had PFT's completed in last 2 years    Current Regimen  Advair Diskus 250-50 mcg/act 1 puff twice daily  Albuterol HFA 90 mcg/act 2 puffs every 4 hours as needed    Clarifications to above regimen: None   Adverse Effects: None   Appropriate technique? Yes    Symptom Management  Current symptoms: dyspnea  Triggers: heat/humidity  Alleviating factors: albuterol  Exercise capacity: not much, limited d/t joint pain  How often do you use your rescue inhaler? Once daily for the past week  mMRC score: 1    Exacerbation Hx  When was your last hospitalization for an exacerbation? Never  When was the last time you were treated with antibiotics and/or steroids? Steroids for joint pain   Total number of hospitalizations d/t exacerbation: 0    Immunization History:  Influenza: 9/26/24  PCV20: 3/28/25  COVID: 9/28/24    Smoking History  She currently smokes 0.25 packs per day.    Objective   Allergies[1]  Social History     Social History Narrative    Not on file      Medication Review  Current Outpatient Medications   Medication Instructions    albuterol (ProAir HFA) 90 mcg/actuation inhaler 2 puffs, inhalation, Every 4 hours PRN    atorvastatin (LIPITOR) 10 mg, oral, Nightly    cholecalciferol (Vitamin D-3) 50 mcg (2,000 unit) capsule Take  by mouth.    cyanocobalamin (Vitamin B-12) 1,000 mcg tablet 1 tablet, Daily    fluticasone (Flonase) 50 mcg/actuation nasal spray 1-2 sprays, Each Nostril, Daily    fluticasone propion-salmeteroL (Advair Diskus) 250-50 mcg/dose diskus inhaler 1 puff, inhalation, Every 12 hours, Rinse mouth after each use    gabapentin (NEURONTIN) 600 mg, oral, 3 times daily    hydroCHLOROthiazide (HYDRODIURIL) 25 mg, oral, Daily    meloxicam (MOBIC) 15 mg, oral, Daily    multivitamin tablet 1 tablet, Daily    nicotine (Nicoderm CQ) 14 mg/24 hr patch 1 patch, transdermal, Every 24 hours    nicotine (Nicoderm CQ) 7 mg/24 hr patch 1 patch, transdermal, Every 24 hours    SUMAtriptan (Imitrex) 100 mg tablet TAKE 1 TABLET AT THE FIRST SIGN OF HEADACHE , TAKE ANOTHER ONE IN 2 HRS IF NO IMPROVEMENT IN HEADACHE . MAX 2 TABLETS PER DAY    tiZANidine (ZANAFLEX) 4 mg, oral, 3 times daily    topiramate (TOPAMAX) 50 mg, oral, 2 times daily    valACYclovir (VALTREX) 1,000 mg, Every 12 hours    venlafaxine XR (EFFEXOR-XR) 75 mg, oral, Daily    venlafaxine XR (EFFEXOR-XR) 37.5 mg, oral, Daily, Take along with 75mg      Vitals  BP Readings from Last 2 Encounters:   06/12/25 123/80   03/10/25 119/79     BMI Readings from Last 1 Encounters:   06/12/25 38.01 kg/m²      Labs  A1C  Lab Results   Component Value Date    HGBA1C 5.6 04/11/2025    HGBA1C 5.4 01/09/2024    HGBA1C 5.3 02/24/2023     BMP  Lab Results   Component Value Date    CALCIUM 9.4 04/11/2025     04/11/2025    K 3.9 04/11/2025    CO2 30 04/11/2025     04/11/2025    BUN 19 04/11/2025    CREATININE 0.82 04/11/2025    EGFR 84 04/11/2025     LFTs  Lab Results   Component Value Date    ALT 17 04/11/2025    AST 20 04/11/2025    ALKPHOS 71 04/11/2025    BILITOT 0.5 04/11/2025     FLP  Lab Results   Component Value Date    TRIG 49 04/11/2025    CHOL 182 04/11/2025    LDLF 124 (H) 02/24/2023    LDLCALC 110 (H) 04/11/2025    HDL 58 04/11/2025     Urine Microalbumin  No results found for:  "\"MICROALBCREA\"  Weight Management  Wt Readings from Last 3 Encounters:   06/12/25 104 kg (228 lb 6.4 oz)   03/10/25 103 kg (227 lb)   02/03/25 105 kg (231 lb 7.7 oz)      There is no height or weight on file to calculate BMI.     Assessment/Plan   Problem List Items Addressed This Visit    None  Visit Diagnoses         Asthma with COPD (Multi)              Patient with COPD and Asthma that is typically well controlled on Advair, however the recent heat and humidity has had her using her rescue inhaler daily. She is on appropriate inhaler therapy. Continue on current therapy and patient will reach out if respiratory symptoms worsen for therapy change or increased dose of Advair. She is working towards tobacco cessation, but has not had success yet. Has set quit date for Sunday 7/13.    Medication Changes:  CONTINUE  Advair Diskus 250-50 mcg/act 1 puff twice daily  Albuterol HFA 90 mcg/act 2 puffs every 4 hours as needed     Clinical Pharmacist follow-up: as needed per patient preference, Telehealth visit  Patient is not followed in CCM.     Continue all meds under the continuation of care with the referring provider and clinical pharmacy team.    Thank you,  Daria Aragon  Clinical Pharmacist  [phone]    Verbal consent to manage patient's drug therapy was obtained from the patient. They were informed they may decline to participate or withdraw from participation in pharmacy services at any time.       [1] No Known Allergies    "

## 2025-07-13 DIAGNOSIS — G43.E09 CHRONIC MIGRAINE WITH AURA WITHOUT STATUS MIGRAINOSUS, NOT INTRACTABLE: ICD-10-CM

## 2025-07-14 RX ORDER — SUMATRIPTAN SUCCINATE 100 MG/1
TABLET ORAL
Qty: 9 TABLET | Refills: 2 | Status: SHIPPED | OUTPATIENT
Start: 2025-07-14

## 2025-07-19 DIAGNOSIS — M47.22 OSTEOARTHRITIS OF SPINE WITH RADICULOPATHY, CERVICAL REGION: ICD-10-CM

## 2025-07-19 DIAGNOSIS — M46.1 DEGENERATIVE JOINT DISEASE OF SACROILIAC JOINT: ICD-10-CM

## 2025-07-19 DIAGNOSIS — Z98.1 S/P CERVICAL SPINAL FUSION: ICD-10-CM

## 2025-07-19 DIAGNOSIS — M17.0 OSTEOARTHRITIS OF BOTH KNEES, UNSPECIFIED OSTEOARTHRITIS TYPE: ICD-10-CM

## 2025-07-21 RX ORDER — MELOXICAM 15 MG/1
15 TABLET ORAL DAILY
Qty: 90 TABLET | Refills: 0 | Status: SHIPPED | OUTPATIENT
Start: 2025-07-21

## 2025-10-16 ENCOUNTER — APPOINTMENT (OUTPATIENT)
Dept: PRIMARY CARE | Facility: CLINIC | Age: 55
End: 2025-10-16
Payer: COMMERCIAL